# Patient Record
Sex: FEMALE | Race: WHITE | Employment: FULL TIME | URBAN - METROPOLITAN AREA
[De-identification: names, ages, dates, MRNs, and addresses within clinical notes are randomized per-mention and may not be internally consistent; named-entity substitution may affect disease eponyms.]

---

## 2018-05-17 PROBLEM — N92.0 MENORRHAGIA WITH REGULAR CYCLE: Status: ACTIVE | Noted: 2018-05-17

## 2018-07-03 ENCOUNTER — HOSPITAL ENCOUNTER (OUTPATIENT)
Dept: GENERAL RADIOLOGY | Age: 53
Discharge: HOME | End: 2018-07-05
Payer: COMMERCIAL

## 2018-07-03 DIAGNOSIS — Z12.31 SCREENING MAMMOGRAM, ENCOUNTER FOR: ICD-10-CM

## 2018-07-03 PROCEDURE — 77063 BREAST TOMOSYNTHESIS BI: CPT

## 2018-07-06 ENCOUNTER — OFFICE VISIT (OUTPATIENT)
Dept: PRIMARY CARE CLINIC | Age: 53
End: 2018-07-06
Payer: COMMERCIAL

## 2018-07-06 VITALS
BODY MASS INDEX: 25.95 KG/M2 | DIASTOLIC BLOOD PRESSURE: 64 MMHG | SYSTOLIC BLOOD PRESSURE: 112 MMHG | RESPIRATION RATE: 14 BRPM | OXYGEN SATURATION: 99 % | TEMPERATURE: 98.1 F | HEART RATE: 88 BPM | WEIGHT: 141 LBS | HEIGHT: 62 IN

## 2018-07-06 DIAGNOSIS — E78.00 PURE HYPERCHOLESTEROLEMIA: Primary | ICD-10-CM

## 2018-07-06 PROCEDURE — 99213 OFFICE O/P EST LOW 20 MIN: CPT | Performed by: PHYSICIAN ASSISTANT

## 2018-07-06 NOTE — PROGRESS NOTES
breath, chest heaviness, pleuritic pain,  wheezing. Cardiovascular: Negative for diaphoresis, chest pain, palpitations, or edema   Gastrointestinal: Negative for nausea, vomiting, abdominal pain, diarrhea, constipation, blood in stool, melena, changes in bowel habits, abdominal distention, anal bleeding and rectal pain. Endocrine: Negative for polydipsia, polyphagia and polyuria. No heat or cold intolerance. Genitourinary: Negative for dysuria, urgency, frequency, hematuria, difficulty urinating, nocturia. Musculoskeletal: as per HPI. Negative at this time for myalgias, back pain, joint swelling and arthralgias. No gait disturbance. Skin: Negative for rash, lesions, hair or nail changes. Allergic/Immunologic: Negative for environmental allergies and food allergies. Neurological: Negative for dizziness, weakness, tremors, syncope, speech difficulty, light-headedness, bowel or bladder control changes, numbness and headaches. Hematological: Negative for adenopathy. Does not bruise/bleed easily. Psychiatric/Behavioral: Negative for suicidal ideas, behavioral problems, sleep disturbance and decreased concentration. The patient is not nervous/anxious. Unless otherwise stated in this report or unable to obtain because of the patient's clinical or mental status as evidenced by the medical record, this patients's positive and negative responses for Review of Systems, constitutional, psych, eyes, ENT, cardiovascular, respiratory, gastrointestinal, neurological, genitourinary, musculoskeletal, integument systems and systems related to the presenting problem are either stated in the preceding or were not pertinent or were negative for the symptoms and/or complaints related to the medical problem.         Physical Exam:   Vitals:    07/06/18 1605   BP: 112/64   Pulse: 88   Resp: 14   Temp: 98.1 °F (36.7 °C)   SpO2: 99%   Weight: 141 lb (64 kg)   Height: 5' 2\" (1.575 m)     Constitutional:  A and O x 3, in

## 2018-07-17 ENCOUNTER — HOSPITAL ENCOUNTER (OUTPATIENT)
Age: 53
Discharge: HOME | End: 2018-07-19
Payer: COMMERCIAL

## 2018-07-17 DIAGNOSIS — E78.00 PURE HYPERCHOLESTEROLEMIA: ICD-10-CM

## 2018-07-17 LAB
ALBUMIN SERPL-MCNC: 4.2 G/DL (ref 3.5–5.2)
ALP BLD-CCNC: 72 U/L (ref 35–104)
ALT SERPL-CCNC: 22 U/L (ref 0–32)
ANION GAP SERPL CALCULATED.3IONS-SCNC: 12 MMOL/L (ref 7–16)
AST SERPL-CCNC: 25 U/L (ref 0–31)
BASOPHILS ABSOLUTE: 0.02 E9/L (ref 0–0.2)
BASOPHILS RELATIVE PERCENT: 0.3 % (ref 0–2)
BILIRUB SERPL-MCNC: 0.5 MG/DL (ref 0–1.2)
BUN BLDV-MCNC: 12 MG/DL (ref 6–20)
CALCIUM SERPL-MCNC: 9.6 MG/DL (ref 8.6–10.2)
CHLORIDE BLD-SCNC: 100 MMOL/L (ref 98–107)
CHOLESTEROL, TOTAL: 244 MG/DL (ref 0–199)
CO2: 26 MMOL/L (ref 22–29)
CREAT SERPL-MCNC: 0.7 MG/DL (ref 0.5–1)
EOSINOPHILS ABSOLUTE: 0.05 E9/L (ref 0.05–0.5)
EOSINOPHILS RELATIVE PERCENT: 0.7 % (ref 0–6)
GFR AFRICAN AMERICAN: >60
GFR NON-AFRICAN AMERICAN: >60 ML/MIN/1.73
GLUCOSE BLD-MCNC: 87 MG/DL (ref 74–109)
HCT VFR BLD CALC: 38.1 % (ref 34–48)
HDLC SERPL-MCNC: 43 MG/DL
HEMOGLOBIN: 12.5 G/DL (ref 11.5–15.5)
IMMATURE GRANULOCYTES #: 0.02 E9/L
IMMATURE GRANULOCYTES %: 0.3 % (ref 0–5)
LDL CHOLESTEROL CALCULATED: 176 MG/DL (ref 0–99)
LYMPHOCYTES ABSOLUTE: 1.16 E9/L (ref 1.5–4)
LYMPHOCYTES RELATIVE PERCENT: 17.3 % (ref 20–42)
MCH RBC QN AUTO: 30.2 PG (ref 26–35)
MCHC RBC AUTO-ENTMCNC: 32.8 % (ref 32–34.5)
MCV RBC AUTO: 92 FL (ref 80–99.9)
MONOCYTES ABSOLUTE: 0.63 E9/L (ref 0.1–0.95)
MONOCYTES RELATIVE PERCENT: 9.4 % (ref 2–12)
NEUTROPHILS ABSOLUTE: 4.81 E9/L (ref 1.8–7.3)
NEUTROPHILS RELATIVE PERCENT: 72 % (ref 43–80)
PDW BLD-RTO: 12.5 FL (ref 11.5–15)
PLATELET # BLD: 434 E9/L (ref 130–450)
PMV BLD AUTO: 10.2 FL (ref 7–12)
POTASSIUM SERPL-SCNC: 3.9 MMOL/L (ref 3.5–5)
RBC # BLD: 4.14 E12/L (ref 3.5–5.5)
SODIUM BLD-SCNC: 138 MMOL/L (ref 132–146)
TOTAL PROTEIN: 7.2 G/DL (ref 6.4–8.3)
TRIGL SERPL-MCNC: 126 MG/DL (ref 0–149)
VLDLC SERPL CALC-MCNC: 25 MG/DL
WBC # BLD: 6.7 E9/L (ref 4.5–11.5)

## 2018-07-17 PROCEDURE — 80053 COMPREHEN METABOLIC PANEL: CPT

## 2018-07-17 PROCEDURE — 85025 COMPLETE CBC W/AUTO DIFF WBC: CPT

## 2018-07-17 PROCEDURE — 80061 LIPID PANEL: CPT

## 2019-01-15 ENCOUNTER — OFFICE VISIT (OUTPATIENT)
Dept: PRIMARY CARE CLINIC | Age: 54
End: 2019-01-15
Payer: COMMERCIAL

## 2019-01-15 VITALS
WEIGHT: 136 LBS | SYSTOLIC BLOOD PRESSURE: 124 MMHG | TEMPERATURE: 98 F | DIASTOLIC BLOOD PRESSURE: 78 MMHG | OXYGEN SATURATION: 99 % | HEART RATE: 74 BPM | BODY MASS INDEX: 24.87 KG/M2

## 2019-01-15 DIAGNOSIS — E78.00 PURE HYPERCHOLESTEROLEMIA: Primary | ICD-10-CM

## 2019-01-15 DIAGNOSIS — F41.1 GAD (GENERALIZED ANXIETY DISORDER): ICD-10-CM

## 2019-01-15 PROCEDURE — 99213 OFFICE O/P EST LOW 20 MIN: CPT | Performed by: PHYSICIAN ASSISTANT

## 2019-01-15 RX ORDER — ALPRAZOLAM 0.25 MG/1
0.25 TABLET ORAL DAILY PRN
Qty: 30 TABLET | Refills: 0 | Status: SHIPPED | OUTPATIENT
Start: 2019-01-15 | End: 2019-08-30 | Stop reason: SDUPTHER

## 2019-01-15 RX ORDER — BUPROPION HYDROCHLORIDE 150 MG/1
TABLET, EXTENDED RELEASE ORAL
COMMUNITY
End: 2019-01-15 | Stop reason: ALTCHOICE

## 2019-01-15 RX ORDER — VITAMIN B COMPLEX
1 CAPSULE ORAL DAILY
COMMUNITY

## 2019-01-15 RX ORDER — AMPICILLIN TRIHYDRATE 250 MG
CAPSULE ORAL
COMMUNITY
End: 2019-01-15 | Stop reason: ALTCHOICE

## 2019-01-15 RX ORDER — ATORVASTATIN CALCIUM 20 MG/1
TABLET, FILM COATED ORAL
COMMUNITY
End: 2019-01-15 | Stop reason: ALTCHOICE

## 2019-01-15 ASSESSMENT — PATIENT HEALTH QUESTIONNAIRE - PHQ9
SUM OF ALL RESPONSES TO PHQ9 QUESTIONS 1 & 2: 0
SUM OF ALL RESPONSES TO PHQ QUESTIONS 1-9: 0
SUM OF ALL RESPONSES TO PHQ QUESTIONS 1-9: 0
2. FEELING DOWN, DEPRESSED OR HOPELESS: 0
1. LITTLE INTEREST OR PLEASURE IN DOING THINGS: 0

## 2019-01-17 ENCOUNTER — HOSPITAL ENCOUNTER (OUTPATIENT)
Age: 54
Discharge: HOME | End: 2019-01-19
Payer: COMMERCIAL

## 2019-01-17 DIAGNOSIS — E78.00 PURE HYPERCHOLESTEROLEMIA: ICD-10-CM

## 2019-01-17 DIAGNOSIS — F41.1 GAD (GENERALIZED ANXIETY DISORDER): ICD-10-CM

## 2019-01-17 LAB
ALBUMIN SERPL-MCNC: 4.5 G/DL (ref 3.5–5.2)
ALP BLD-CCNC: 47 U/L (ref 35–104)
ALT SERPL-CCNC: 14 U/L (ref 0–32)
ANION GAP SERPL CALCULATED.3IONS-SCNC: 22 MMOL/L (ref 7–16)
AST SERPL-CCNC: 22 U/L (ref 0–31)
BASOPHILS ABSOLUTE: 0.02 E9/L (ref 0–0.2)
BASOPHILS RELATIVE PERCENT: 0.4 % (ref 0–2)
BILIRUB SERPL-MCNC: 0.4 MG/DL (ref 0–1.2)
BUN BLDV-MCNC: 12 MG/DL (ref 6–20)
CALCIUM SERPL-MCNC: 9.1 MG/DL (ref 8.6–10.2)
CHLORIDE BLD-SCNC: 99 MMOL/L (ref 98–107)
CHOLESTEROL, TOTAL: 239 MG/DL (ref 0–199)
CO2: 21 MMOL/L (ref 22–29)
CREAT SERPL-MCNC: 0.8 MG/DL (ref 0.5–1)
EOSINOPHILS ABSOLUTE: 0.12 E9/L (ref 0.05–0.5)
EOSINOPHILS RELATIVE PERCENT: 2.2 % (ref 0–6)
GFR AFRICAN AMERICAN: >60
GFR NON-AFRICAN AMERICAN: >60 ML/MIN/1.73
GLUCOSE BLD-MCNC: 80 MG/DL (ref 74–99)
HCT VFR BLD CALC: 41 % (ref 34–48)
HDLC SERPL-MCNC: 44 MG/DL
HEMOGLOBIN: 13 G/DL (ref 11.5–15.5)
IMMATURE GRANULOCYTES #: 0.01 E9/L
IMMATURE GRANULOCYTES %: 0.2 % (ref 0–5)
LDL CHOLESTEROL CALCULATED: 166 MG/DL (ref 0–99)
LYMPHOCYTES ABSOLUTE: 1.21 E9/L (ref 1.5–4)
LYMPHOCYTES RELATIVE PERCENT: 22.6 % (ref 20–42)
MCH RBC QN AUTO: 30.6 PG (ref 26–35)
MCHC RBC AUTO-ENTMCNC: 31.7 % (ref 32–34.5)
MCV RBC AUTO: 96.5 FL (ref 80–99.9)
MONOCYTES ABSOLUTE: 0.58 E9/L (ref 0.1–0.95)
MONOCYTES RELATIVE PERCENT: 10.8 % (ref 2–12)
NEUTROPHILS ABSOLUTE: 3.42 E9/L (ref 1.8–7.3)
NEUTROPHILS RELATIVE PERCENT: 63.8 % (ref 43–80)
PDW BLD-RTO: 12.8 FL (ref 11.5–15)
PLATELET # BLD: 311 E9/L (ref 130–450)
PMV BLD AUTO: 10.5 FL (ref 7–12)
POTASSIUM SERPL-SCNC: 3.9 MMOL/L (ref 3.5–5)
RBC # BLD: 4.25 E12/L (ref 3.5–5.5)
SODIUM BLD-SCNC: 142 MMOL/L (ref 132–146)
TOTAL PROTEIN: 7.2 G/DL (ref 6.4–8.3)
TRIGL SERPL-MCNC: 147 MG/DL (ref 0–149)
VLDLC SERPL CALC-MCNC: 29 MG/DL
WBC # BLD: 5.4 E9/L (ref 4.5–11.5)

## 2019-01-17 PROCEDURE — 80053 COMPREHEN METABOLIC PANEL: CPT

## 2019-01-17 PROCEDURE — 80061 LIPID PANEL: CPT

## 2019-01-17 PROCEDURE — 85025 COMPLETE CBC W/AUTO DIFF WBC: CPT

## 2019-03-27 ENCOUNTER — HOSPITAL ENCOUNTER (OUTPATIENT)
Dept: GENERAL RADIOLOGY | Age: 54
Discharge: HOME | End: 2019-03-29
Payer: COMMERCIAL

## 2019-03-27 DIAGNOSIS — N63.20 BREAST MASS, LEFT: ICD-10-CM

## 2019-03-27 PROCEDURE — G0279 TOMOSYNTHESIS, MAMMO: HCPCS

## 2019-03-27 PROCEDURE — 76642 ULTRASOUND BREAST LIMITED: CPT

## 2019-08-30 ENCOUNTER — OFFICE VISIT (OUTPATIENT)
Dept: PRIMARY CARE CLINIC | Age: 54
End: 2019-08-30
Payer: COMMERCIAL

## 2019-08-30 ENCOUNTER — HOSPITAL ENCOUNTER (OUTPATIENT)
Age: 54
Discharge: HOME | End: 2019-09-01
Payer: COMMERCIAL

## 2019-08-30 VITALS
BODY MASS INDEX: 24.75 KG/M2 | HEIGHT: 62 IN | DIASTOLIC BLOOD PRESSURE: 60 MMHG | SYSTOLIC BLOOD PRESSURE: 98 MMHG | TEMPERATURE: 98.1 F | OXYGEN SATURATION: 96 % | HEART RATE: 83 BPM | WEIGHT: 134.5 LBS

## 2019-08-30 DIAGNOSIS — E78.00 PURE HYPERCHOLESTEROLEMIA: Primary | ICD-10-CM

## 2019-08-30 DIAGNOSIS — E78.00 PURE HYPERCHOLESTEROLEMIA: ICD-10-CM

## 2019-08-30 DIAGNOSIS — F41.1 GAD (GENERALIZED ANXIETY DISORDER): ICD-10-CM

## 2019-08-30 LAB
ALBUMIN SERPL-MCNC: 4.6 G/DL (ref 3.5–5.2)
ALP BLD-CCNC: 46 U/L (ref 35–104)
ALT SERPL-CCNC: 20 U/L (ref 0–32)
ANION GAP SERPL CALCULATED.3IONS-SCNC: 11 MMOL/L (ref 7–16)
AST SERPL-CCNC: 28 U/L (ref 0–31)
BILIRUB SERPL-MCNC: 0.6 MG/DL (ref 0–1.2)
BUN BLDV-MCNC: 12 MG/DL (ref 6–20)
CALCIUM SERPL-MCNC: 9.3 MG/DL (ref 8.6–10.2)
CHLORIDE BLD-SCNC: 103 MMOL/L (ref 98–107)
CHOLESTEROL, TOTAL: 206 MG/DL (ref 0–199)
CO2: 26 MMOL/L (ref 22–29)
CREAT SERPL-MCNC: 0.8 MG/DL (ref 0.5–1)
GFR AFRICAN AMERICAN: >60
GFR NON-AFRICAN AMERICAN: >60 ML/MIN/1.73
GLUCOSE BLD-MCNC: 92 MG/DL (ref 74–99)
HDLC SERPL-MCNC: 51 MG/DL
LDL CHOLESTEROL CALCULATED: 137 MG/DL (ref 0–99)
POTASSIUM SERPL-SCNC: 4.9 MMOL/L (ref 3.5–5)
SODIUM BLD-SCNC: 140 MMOL/L (ref 132–146)
TOTAL PROTEIN: 7.2 G/DL (ref 6.4–8.3)
TRIGL SERPL-MCNC: 91 MG/DL (ref 0–149)
VLDLC SERPL CALC-MCNC: 18 MG/DL

## 2019-08-30 PROCEDURE — 80053 COMPREHEN METABOLIC PANEL: CPT

## 2019-08-30 PROCEDURE — 80061 LIPID PANEL: CPT

## 2019-08-30 PROCEDURE — 99213 OFFICE O/P EST LOW 20 MIN: CPT | Performed by: PHYSICIAN ASSISTANT

## 2019-08-30 RX ORDER — AMPICILLIN TRIHYDRATE 250 MG
CAPSULE ORAL
COMMUNITY

## 2019-08-30 RX ORDER — ALPRAZOLAM 0.25 MG/1
0.25 TABLET ORAL DAILY PRN
Qty: 30 TABLET | Refills: 0
Start: 2019-08-30 | End: 2019-09-29

## 2019-10-25 ENCOUNTER — OFFICE VISIT (OUTPATIENT)
Dept: FAMILY MEDICINE CLINIC | Age: 54
End: 2019-10-25
Payer: COMMERCIAL

## 2019-10-25 VITALS
SYSTOLIC BLOOD PRESSURE: 138 MMHG | DIASTOLIC BLOOD PRESSURE: 76 MMHG | OXYGEN SATURATION: 97 % | BODY MASS INDEX: 25.28 KG/M2 | TEMPERATURE: 98.1 F | HEART RATE: 96 BPM | WEIGHT: 138.2 LBS

## 2019-10-25 DIAGNOSIS — J06.9 UPPER RESPIRATORY INFECTION WITH COUGH AND CONGESTION: Primary | ICD-10-CM

## 2019-10-25 PROCEDURE — 99213 OFFICE O/P EST LOW 20 MIN: CPT | Performed by: NURSE PRACTITIONER

## 2019-10-25 RX ORDER — BROMPHENIRAMINE MALEATE, PSEUDOEPHEDRINE HYDROCHLORIDE, AND DEXTROMETHORPHAN HYDROBROMIDE 2; 30; 10 MG/5ML; MG/5ML; MG/5ML
10 SYRUP ORAL 4 TIMES DAILY PRN
Qty: 473 ML | Refills: 0 | Status: SHIPPED | OUTPATIENT
Start: 2019-10-25 | End: 2019-12-13 | Stop reason: ALTCHOICE

## 2019-12-13 ENCOUNTER — OFFICE VISIT (OUTPATIENT)
Dept: PRIMARY CARE CLINIC | Age: 54
End: 2019-12-13
Payer: COMMERCIAL

## 2019-12-13 VITALS
HEIGHT: 62 IN | OXYGEN SATURATION: 98 % | WEIGHT: 133 LBS | TEMPERATURE: 97.7 F | BODY MASS INDEX: 24.48 KG/M2 | SYSTOLIC BLOOD PRESSURE: 118 MMHG | DIASTOLIC BLOOD PRESSURE: 60 MMHG | HEART RATE: 99 BPM

## 2019-12-13 DIAGNOSIS — F41.1 GAD (GENERALIZED ANXIETY DISORDER): Primary | ICD-10-CM

## 2019-12-13 PROCEDURE — 99213 OFFICE O/P EST LOW 20 MIN: CPT | Performed by: PHYSICIAN ASSISTANT

## 2020-01-09 ENCOUNTER — HOSPITAL ENCOUNTER (OUTPATIENT)
Age: 55
Discharge: HOME | End: 2020-01-11
Payer: COMMERCIAL

## 2020-01-09 LAB
ALBUMIN SERPL-MCNC: 4.7 G/DL (ref 3.5–5.2)
ALP BLD-CCNC: 48 U/L (ref 35–104)
ALT SERPL-CCNC: 33 U/L (ref 0–32)
ANION GAP SERPL CALCULATED.3IONS-SCNC: 15 MMOL/L (ref 7–16)
AST SERPL-CCNC: 31 U/L (ref 0–31)
BILIRUB SERPL-MCNC: 0.5 MG/DL (ref 0–1.2)
BUN BLDV-MCNC: 15 MG/DL (ref 6–20)
CALCIUM SERPL-MCNC: 9.7 MG/DL (ref 8.6–10.2)
CHLORIDE BLD-SCNC: 101 MMOL/L (ref 98–107)
CHOLESTEROL, TOTAL: 194 MG/DL (ref 0–199)
CO2: 27 MMOL/L (ref 22–29)
CREAT SERPL-MCNC: 0.8 MG/DL (ref 0.5–1)
GFR AFRICAN AMERICAN: >60
GFR NON-AFRICAN AMERICAN: >60 ML/MIN/1.73
GLUCOSE BLD-MCNC: 67 MG/DL (ref 74–99)
HDLC SERPL-MCNC: 53 MG/DL
LDL CHOLESTEROL CALCULATED: 119 MG/DL (ref 0–99)
POTASSIUM SERPL-SCNC: 3.9 MMOL/L (ref 3.5–5)
SODIUM BLD-SCNC: 143 MMOL/L (ref 132–146)
TOTAL PROTEIN: 7.8 G/DL (ref 6.4–8.3)
TRIGL SERPL-MCNC: 111 MG/DL (ref 0–149)
VLDLC SERPL CALC-MCNC: 22 MG/DL

## 2020-01-09 PROCEDURE — 80053 COMPREHEN METABOLIC PANEL: CPT

## 2020-01-09 PROCEDURE — 80061 LIPID PANEL: CPT

## 2020-01-13 RX ORDER — PITAVASTATIN CALCIUM 2.09 MG/1
TABLET, FILM COATED ORAL
Qty: 90 TABLET | Refills: 1 | OUTPATIENT
Start: 2020-01-13

## 2020-07-01 RX ORDER — PITAVASTATIN CALCIUM 4.18 MG/1
TABLET, FILM COATED ORAL
Qty: 90 TABLET | Refills: 1 | Status: SHIPPED
Start: 2020-07-01 | End: 2020-07-21 | Stop reason: SDUPTHER

## 2020-07-22 RX ORDER — PITAVASTATIN CALCIUM 4.18 MG/1
4 TABLET, FILM COATED ORAL NIGHTLY
Qty: 90 TABLET | Refills: 1 | Status: SHIPPED
Start: 2020-07-22 | End: 2020-12-11

## 2020-08-31 ENCOUNTER — HOSPITAL ENCOUNTER (OUTPATIENT)
Age: 55
Discharge: HOME | End: 2020-09-02
Payer: COMMERCIAL

## 2020-08-31 ENCOUNTER — NURSE ONLY (OUTPATIENT)
Dept: PRIMARY CARE CLINIC | Age: 55
End: 2020-08-31

## 2020-08-31 PROCEDURE — U0003 INFECTIOUS AGENT DETECTION BY NUCLEIC ACID (DNA OR RNA); SEVERE ACUTE RESPIRATORY SYNDROME CORONAVIRUS 2 (SARS-COV-2) (CORONAVIRUS DISEASE [COVID-19]), AMPLIFIED PROBE TECHNIQUE, MAKING USE OF HIGH THROUGHPUT TECHNOLOGIES AS DESCRIBED BY CMS-2020-01-R: HCPCS

## 2020-09-01 LAB
SARS-COV-2: DETECTED
SOURCE: ABNORMAL

## 2020-09-08 ENCOUNTER — VIRTUAL VISIT (OUTPATIENT)
Dept: PRIMARY CARE CLINIC | Age: 55
End: 2020-09-08
Payer: COMMERCIAL

## 2020-09-08 PROCEDURE — 99213 OFFICE O/P EST LOW 20 MIN: CPT | Performed by: INTERNAL MEDICINE

## 2020-09-08 RX ORDER — AZITHROMYCIN 250 MG/1
250 TABLET, FILM COATED ORAL SEE ADMIN INSTRUCTIONS
Qty: 6 TABLET | Refills: 0 | Status: SHIPPED | OUTPATIENT
Start: 2020-09-08 | End: 2020-09-13

## 2020-09-08 RX ORDER — FLUTICASONE PROPIONATE 50 MCG
2 SPRAY, SUSPENSION (ML) NASAL DAILY
Qty: 1 BOTTLE | Refills: 0 | Status: SHIPPED
Start: 2020-09-08 | End: 2022-03-11

## 2020-09-08 ASSESSMENT — PATIENT HEALTH QUESTIONNAIRE - PHQ9
SUM OF ALL RESPONSES TO PHQ QUESTIONS 1-9: 0
2. FEELING DOWN, DEPRESSED OR HOPELESS: 0
SUM OF ALL RESPONSES TO PHQ QUESTIONS 1-9: 0
1. LITTLE INTEREST OR PLEASURE IN DOING THINGS: 0
SUM OF ALL RESPONSES TO PHQ9 QUESTIONS 1 & 2: 0

## 2020-09-08 NOTE — PROGRESS NOTES
9/8/20    Ganga Herzog, a female of 54 y.o. came to the office     Ganga Herzog presents today for evaluation of a cloudy sensation in her head dizziness and headaches. Her symptoms started on August 29 with fatigue myalgias and a cough. She was tested for coronavirus on September 1 and was positive. Since then she has been taking ibuprofen multiple times throughout a day which helps with her headaches but her cloudiness dizziness ear pain and sinus congestion have persisted. She denies any fevers chills or GI issues. Her chronic medical history is significant for anxiety and hypercholesterolemia. She denies any seasonal allergies or asthma      Patient Active Problem List   Diagnosis    Pure hypercholesterolemia    SUZANNE (generalized anxiety disorder)    Menorrhagia with regular cycle      No Known Allergies  Current Outpatient Medications on File Prior to Visit   Medication Sig Dispense Refill    sertraline (ZOLOFT) 50 MG tablet Take 1 tablet by mouth daily 30 tablet 5    pitavastatin (LIVALO) 4 MG TABS tablet Take 1 tablet by mouth nightly 90 tablet 1    Coenzyme Q10 (COQ10) 200 MG CAPS Take by mouth      b complex vitamins capsule Take 1 capsule by mouth daily      vitamin D (CHOLECALCIFEROL) 1000 UNIT TABS tablet Take 1,000 Units by mouth daily       No current facility-administered medications on file prior to visit. Review of Systems  Constitutional:Negative for activity change, appetite change, chills, fatigue and fever. Respiratory: Negative for choking, chest tightness, shortness of breath and wheezing. Cardiovascular: Negative for chest pain, palpitations and leg swelling. Gastrointestinal: Negative for abdominal distention, constipation, diarrhea, nausea and vomiting. Musculoskeletal: Negative for arthralgias, back pain, gait problem and joint swelling. Neurological: Negative for dizziness, weakness,numbness and headaches.      There were no vitals taken for this visit.     Physical Exam   Constitutional:  Oriented to person, place, and time. Appears well-developed and well-nourished. No acute distress. HENT: No sinus tenderness or lymphadenopathy  Head: Normocephalic and atraumatic. Eyes: Eyes exhibits no discharge. No scleral icterus present. Neck: No tracheal deviation present. No thyromegaly present. Cardiovascular: Normal rate, regular rhythm, normal heart sounds and intact distal pulses. Exam reveals no gallop nor friction rub. No murmur heard. Pulmonary: Effort normal and breath sounds normal. No respiratory distress. No wheezes or rales. Musculoskeletal:  No tenderness to palpation  Neurological:Alert and oriented to person, place, and time. Skin: No diaphoresis. Psychiatric: Normal mood and affect. Behavior is Normal.     ASSESSMENT AND PLAN:    Myra Ask was seen today for sinusitis. Diagnoses and all orders for this visit:    Coronavirus infection        Lauren Wild presents today for evaluation of persistent URI symptoms. She was previously diagnosed with coronavirus. She is failed symptomatic management. I will treat her with azithromycin for potential superimposed bacterial infection. I will also treat her with Flonase to combat her symptoms. She should continue ibuprofen and Tylenol as needed for her headaches      Please note that the above documentation was prepared using voice recognition software. Every attempt was made to ensure accuracy but there may be spelling, grammatical, and contextual errors. Electronically signed by Raylene Spatz, DO on 9/8/2020 at 1:57 PM        No follow-ups on file.     Raylene Spatz, DO

## 2021-01-19 ENCOUNTER — VIRTUAL VISIT (OUTPATIENT)
Dept: PRIMARY CARE CLINIC | Age: 56
End: 2021-01-19
Payer: COMMERCIAL

## 2021-01-19 DIAGNOSIS — M54.9 OTHER ACUTE BACK PAIN: ICD-10-CM

## 2021-01-19 DIAGNOSIS — E78.00 PURE HYPERCHOLESTEROLEMIA: Primary | ICD-10-CM

## 2021-01-19 PROCEDURE — 99213 OFFICE O/P EST LOW 20 MIN: CPT | Performed by: PHYSICIAN ASSISTANT

## 2021-01-19 RX ORDER — METHOCARBAMOL 750 MG/1
750 TABLET, FILM COATED ORAL 3 TIMES DAILY PRN
Qty: 30 TABLET | Refills: 0 | Status: SHIPPED | OUTPATIENT
Start: 2021-01-19 | End: 2021-01-29

## 2021-01-19 ASSESSMENT — PATIENT HEALTH QUESTIONNAIRE - PHQ9
SUM OF ALL RESPONSES TO PHQ9 QUESTIONS 1 & 2: 0
1. LITTLE INTEREST OR PLEASURE IN DOING THINGS: 0
SUM OF ALL RESPONSES TO PHQ QUESTIONS 1-9: 0
SUM OF ALL RESPONSES TO PHQ QUESTIONS 1-9: 0
2. FEELING DOWN, DEPRESSED OR HOPELESS: 0

## 2021-01-19 NOTE — PROGRESS NOTES
TELEHEALTH VIDEO VISIT    HPI:    Preston Paul (:  1965) has requested an audio/video evaluation for the following concern(s):    Chief Complaint   Patient presents with    Back Pain     Pt presents with complaints of L sided back pain, notes in shoulder blade area, tells me has been going now for over 1 month. Denies any trauma or injury. She states woke up with it one day, and it hasn't eased up. Denies it worsening, but has been trying 400mg ibuprofen without any relief. Admits standing up seems to erelieve it, but anytime she takes a deep breath, or is sitting and breaths it is painful. Feel pressure when she pushes on it, but not necessarily painful to push. No pain with twisting or lifting. No nocturnal pain. PT is on livalo, hasn't had labs in 1 year. Review of Systems unless otherwise mentioned above, pertinent ROS is considered negative. Prior to Visit Medications    Medication Sig Taking?  Authorizing Provider   methocarbamol (ROBAXIN-750) 750 MG tablet Take 1 tablet by mouth 3 times daily as needed (back pain) Yes Lucille Albert PA-C   LIVALO 4 MG TABS tablet TAKE 1 TABLET BY MOUTH AT  NIGHT Yes Jakob Rosario PA-C   sertraline (ZOLOFT) 50 MG tablet Take 1 tablet by mouth daily Yes Jakob Rosario PA-C   Coenzyme Q10 (COQ10) 200 MG CAPS Take by mouth Yes Historical Provider, MD   b complex vitamins capsule Take 1 capsule by mouth daily Yes Historical Provider, MD   vitamin D (CHOLECALCIFEROL) 1000 UNIT TABS tablet Take 1,000 Units by mouth daily Yes Historical Provider, MD   fluticasone (FLONASE) 50 MCG/ACT nasal spray 2 sprays by Each Nostril route daily  Patient not taking: Reported on 2021  Doreen Alonso DO       Social History     Tobacco Use    Smoking status: Never Smoker    Smokeless tobacco: Never Used   Substance Use Topics    Alcohol use: Yes     Comment: socially    Drug use: No        No Known Allergies,   Past Medical History:   Diagnosis Date 2. Other acute back pain, likely musculosketal but will check cxr as well. She is worried. I will obtain labs. reial muscle relaxant, and to increase ibuprofen up to 800mg at a tie every 6-8 hours. Ice heat to area. - CBC; Future  - methocarbamol (ROBAXIN-750) 750 MG tablet; Take 1 tablet by mouth 3 times daily as needed (back pain)  Dispense: 30 tablet; Refill: 0  - XR CHEST (2 VW); Future  - XR THORACIC SPINE (3 VIEWS); Future      eleMedicine video visit    This visit was performed as a virtual video visit using a synchronous, two-way, audio-video telehealth technology platform. Patient identification was verified at the start of the visit, including the patient's telephone number and physical location. I discussed with the patient the nature of our telehealth visits, that:     1. Due to the nature of an audio- video modality, the only components of a physical exam that could be done are the elements supported by direct observation. 2. I would evaluate the patient and recommend diagnostics and treatments based on my assessment. 3. If it was felt that the patient should be evaluated in clinic or an emergency room setting, then they would be directed there. 4. Our sessions are not being recorded and that personal health information is protected. 5. Our team would provide follow up care in person if/when the patient needs it. Patient does agree to proceed with telemedicine consultation. Patient's location: home address in [facility]  Physician  location home address in [facility] other people involved in call none. Time spent: Greater than Not billed by time    This visit was completed virtually using Doxy. me    --Diego Watkins PA-C on 1/19/2021 at 4:00 PM    An electronic signature was used to authenticate this note.

## 2021-01-20 ENCOUNTER — HOSPITAL ENCOUNTER (OUTPATIENT)
Dept: GENERAL RADIOLOGY | Age: 56
Discharge: HOME | End: 2021-01-22
Payer: COMMERCIAL

## 2021-01-20 ENCOUNTER — HOSPITAL ENCOUNTER (OUTPATIENT)
Age: 56
Discharge: HOME | End: 2021-01-22
Payer: COMMERCIAL

## 2021-01-20 DIAGNOSIS — M54.9 OTHER ACUTE BACK PAIN: ICD-10-CM

## 2021-01-20 PROCEDURE — 71046 X-RAY EXAM CHEST 2 VIEWS: CPT

## 2021-01-20 PROCEDURE — 72072 X-RAY EXAM THORAC SPINE 3VWS: CPT

## 2021-01-21 DIAGNOSIS — M54.9 OTHER ACUTE BACK PAIN: ICD-10-CM

## 2021-01-21 DIAGNOSIS — E78.00 PURE HYPERCHOLESTEROLEMIA: ICD-10-CM

## 2021-01-21 LAB
ALBUMIN SERPL-MCNC: 4.4 G/DL (ref 3.5–5.2)
ALP BLD-CCNC: 43 U/L (ref 35–104)
ALT SERPL-CCNC: 24 U/L (ref 0–32)
ANION GAP SERPL CALCULATED.3IONS-SCNC: 12 MMOL/L (ref 7–16)
AST SERPL-CCNC: 27 U/L (ref 0–31)
BILIRUB SERPL-MCNC: 0.5 MG/DL (ref 0–1.2)
BUN BLDV-MCNC: 11 MG/DL (ref 6–20)
CALCIUM SERPL-MCNC: 9.5 MG/DL (ref 8.6–10.2)
CHLORIDE BLD-SCNC: 103 MMOL/L (ref 98–107)
CHOLESTEROL, TOTAL: 174 MG/DL (ref 0–199)
CO2: 27 MMOL/L (ref 22–29)
CREAT SERPL-MCNC: 0.8 MG/DL (ref 0.5–1)
GFR AFRICAN AMERICAN: >60
GFR NON-AFRICAN AMERICAN: >60 ML/MIN/1.73
GLUCOSE BLD-MCNC: 86 MG/DL (ref 74–99)
HCT VFR BLD CALC: 39.8 % (ref 34–48)
HDLC SERPL-MCNC: 54 MG/DL
HEMOGLOBIN: 13 G/DL (ref 11.5–15.5)
LDL CHOLESTEROL CALCULATED: 97 MG/DL (ref 0–99)
MCH RBC QN AUTO: 30.8 PG (ref 26–35)
MCHC RBC AUTO-ENTMCNC: 32.7 % (ref 32–34.5)
MCV RBC AUTO: 94.3 FL (ref 80–99.9)
PDW BLD-RTO: 12.5 FL (ref 11.5–15)
PLATELET # BLD: 305 E9/L (ref 130–450)
PMV BLD AUTO: 10.1 FL (ref 7–12)
POTASSIUM SERPL-SCNC: 4.1 MMOL/L (ref 3.5–5)
RBC # BLD: 4.22 E12/L (ref 3.5–5.5)
SODIUM BLD-SCNC: 142 MMOL/L (ref 132–146)
TOTAL PROTEIN: 7 G/DL (ref 6.4–8.3)
TRIGL SERPL-MCNC: 113 MG/DL (ref 0–149)
VLDLC SERPL CALC-MCNC: 23 MG/DL
WBC # BLD: 5.4 E9/L (ref 4.5–11.5)

## 2021-08-30 DIAGNOSIS — F41.1 GAD (GENERALIZED ANXIETY DISORDER): ICD-10-CM

## 2021-09-23 DIAGNOSIS — F41.1 GAD (GENERALIZED ANXIETY DISORDER): ICD-10-CM

## 2022-03-10 DIAGNOSIS — F41.1 GAD (GENERALIZED ANXIETY DISORDER): ICD-10-CM

## 2022-03-11 ENCOUNTER — OFFICE VISIT (OUTPATIENT)
Dept: PRIMARY CARE CLINIC | Age: 57
End: 2022-03-11
Payer: COMMERCIAL

## 2022-03-11 VITALS
DIASTOLIC BLOOD PRESSURE: 64 MMHG | HEART RATE: 84 BPM | TEMPERATURE: 97.5 F | WEIGHT: 139 LBS | BODY MASS INDEX: 25.42 KG/M2 | SYSTOLIC BLOOD PRESSURE: 118 MMHG

## 2022-03-11 DIAGNOSIS — E55.9 VITAMIN D DEFICIENCY: ICD-10-CM

## 2022-03-11 DIAGNOSIS — N95.1 HOT FLASH, MENOPAUSAL: ICD-10-CM

## 2022-03-11 DIAGNOSIS — F41.1 GAD (GENERALIZED ANXIETY DISORDER): Primary | ICD-10-CM

## 2022-03-11 DIAGNOSIS — Z11.52 ENCOUNTER FOR SCREENING FOR COVID-19: ICD-10-CM

## 2022-03-11 DIAGNOSIS — Z12.11 ENCOUNTER FOR SCREENING COLONOSCOPY: ICD-10-CM

## 2022-03-11 DIAGNOSIS — Z80.0 FAMILY HISTORY OF COLON CANCER: ICD-10-CM

## 2022-03-11 DIAGNOSIS — E78.00 PURE HYPERCHOLESTEROLEMIA: ICD-10-CM

## 2022-03-11 PROCEDURE — 99214 OFFICE O/P EST MOD 30 MIN: CPT | Performed by: PHYSICIAN ASSISTANT

## 2022-03-11 RX ORDER — VENLAFAXINE HYDROCHLORIDE 37.5 MG/1
37.5 CAPSULE, EXTENDED RELEASE ORAL DAILY
Qty: 30 CAPSULE | Refills: 2 | Status: SHIPPED
Start: 2022-03-11 | End: 2022-04-29 | Stop reason: SDUPTHER

## 2022-03-11 ASSESSMENT — PATIENT HEALTH QUESTIONNAIRE - PHQ9
SUM OF ALL RESPONSES TO PHQ QUESTIONS 1-9: 0
2. FEELING DOWN, DEPRESSED OR HOPELESS: 0

## 2022-03-11 NOTE — PROGRESS NOTES
Deysi Caty  1965  3/11/22      HPI:  Patient presents for FU of her hyperlipidemia. She admits that Livalo is expensive so to cut cost she has been taking it 4 days a week. States if lipids come back high she will go to 7 days a week. She does well on it and really doesn't want to change, due to side effects from other statins. She has been well controlled with her anxiety on the sertraline, but admits that she would like to switch to Effexor, as she is sufferin gfrom postmenopausal hot flashes. She also has been taking Vit D, needs this level checked. Gets all GYN care routinely. Is overdue for colonoscopy. Past Medical History:   Diagnosis Date    Pure hypercholesterolemia 7/27/2016        Past Surgical History:   Procedure Laterality Date    BREAST SURGERY Left     benign lesion    CHOLECYSTECTOMY         Current Outpatient Medications   Medication Sig Dispense Refill    venlafaxine (EFFEXOR XR) 37.5 MG extended release capsule Take 1 capsule by mouth daily 30 capsule 2    LIVALO 4 MG TABS tablet TAKE 1 TABLET BY MOUTH AT  NIGHT 90 tablet 0    Coenzyme Q10 (COQ10) 200 MG CAPS Take by mouth      b complex vitamins capsule Take 1 capsule by mouth daily      vitamin D (CHOLECALCIFEROL) 1000 UNIT TABS tablet Take 1,000 Units by mouth daily       No current facility-administered medications for this visit. No Known Allergies    No family history on file.     Social History     Socioeconomic History    Marital status:      Spouse name: Not on file    Number of children: Not on file    Years of education: Not on file    Highest education level: Not on file   Occupational History    Not on file   Tobacco Use    Smoking status: Never Smoker    Smokeless tobacco: Never Used   Substance and Sexual Activity    Alcohol use: Yes     Comment: socially    Drug use: No    Sexual activity: Yes     Partners: Male   Other Topics Concern    Not on file   Social History Narrative    Not on file     Social Determinants of Health     Financial Resource Strain:     Difficulty of Paying Living Expenses: Not on file   Food Insecurity:     Worried About Running Out of Food in the Last Year: Not on file    [place] of Food in the Last Year: Not on file   Transportation Needs:     Lack of Transportation (Medical): Not on file    Lack of Transportation (Non-Medical): Not on file   Physical Activity:     Days of Exercise per Week: Not on file    Minutes of Exercise per Session: Not on file   Stress:     Feeling of Stress : Not on file   Social Connections:     Frequency of Communication with Friends and Family: Not on file    Frequency of Social Gatherings with Friends and Family: Not on file    Attends [employer] Services: Not on file    Active Member of [address] [place] [address] or Organizations: Not on file    Attends Club or Organization Meetings: Not on file    Marital Status: Not on file   Intimate Partner Violence:     Fear of Current or Ex-Partner: Not on file    Emotionally Abused: Not on file    Physically Abused: Not on file    Sexually Abused: Not on file   Housing Stability:     Unable to Pay for Housing in the Last Year: Not on file    Number of [place] in the Last Year: Not on file    Unstable Housing in the Last Year: Not on file       Review of Systems :    Constitutional: Negative for fatigue, malaise, fever, chills, appetite change and unexpected weight change. HENT: Negative for congestion, ear discharge, ear pain, facial swelling, mouth sores, postnasal drip, rhinorrhea, sinus pressure, sore throat, tinnitus and trouble swallowing. Eyes: Negative for vision changes, double vision, pain  Respiratory: Negative for cough, chest tightness, shortness of breath, chest heaviness, pleuritic pain,  wheezing.    Cardiovascular: Negative for diaphoresis, chest pain, palpitations, or edema   Gastrointestinal: Negative for nausea, vomiting, abdominal pain, diarrhea, constipation, blood in stool, melena, changes in bowel habits, abdominal distention, anal bleeding and rectal pain. Endocrine: Negative for polydipsia, polyphagia and polyuria. No heat or cold intolerance. Genitourinary: Negative for dysuria, urgency, frequency, hematuria, difficulty urinating, nocturia. Musculoskeletal: Negative for myalgias, back pain, joint swelling and arthralgias. No gait disturbance. Skin: Negative for rash, lesions, hair or nail changes. Allergic/Immunologic: Negative for environmental allergies and food allergies. Neurological: Negative for dizziness, weakness, tremors, syncope, speech difficulty, light-headedness, bowel or bladder control changes, numbness and headaches. Hematological: Negative for adenopathy. Does not bruise/bleed easily. Psychiatric/Behavioral: Negative for suicidal ideas, behavioral problems, sleep disturbance and decreased concentration. Unless otherwise stated in this report or unable to obtain because of the patient's clinical or mental status as evidenced by the medical record, this patients's positive and negative responses for Review of Systems, constitutional, psych, eyes, ENT, cardiovascular, respiratory, gastrointestinal, neurological, genitourinary, musculoskeletal, integument systems and systems related to the presenting problem are either stated in the preceding or were not pertinent or were negative for the symptoms and/or complaints related to the medical problem. Physical Exam:   Vitals:    03/11/22 0924   BP: 118/64   Pulse: 84   Temp: 97.5 °F (36.4 °C)   Weight: 139 lb (63 kg)     Constitutional:  A and O x 3, in NAD. Afebrile. Appears stated age. Head:  NCAT. Eyes:  PERRLA, EOMI without nystagmus. Conjunctiva normal. Sclera anicteric. Neck:  Supple. Nontender, no lymphadenopathy noted, no thyromegaly. Lungs:  Clear to auscultation and breath sounds equal.  Heart:  Regular rate and rhythm, normal S1 and S2, no m/r/g. Abdomen:  Soft, NTND, NABS x 4, no masses or organomegaly, no rebound or guarding. MS: Normal, painless range of motion. No neurovascular deficit. 5/5 strength in UE's/LE's/ bilaterally. Skin:  No abrasions, ecchymoses, or lacerations unless noted elsewhere. Extremities  No cyanosis, clubbing, or edema noted. Neurological:   Oriented. Motor functions intact. Psych: pleasant, normal affect, appropriate thoughts and insight. Assessment/Plan:     Marge Church was seen today for hyperlipidemia. Diagnoses and all orders for this visit:    SUZANNE (generalized anxiety disorder)  -     venlafaxine (EFFEXOR XR) 37.5 MG extended release capsule; Take 1 capsule by mouth daily  Hot flash, menopausal  -     venlafaxine (EFFEXOR XR) 37.5 MG extended release capsule; Take 1 capsule by mouth daily  -     CBC; Future   Pt is to go to 25mg of sertraline for 2 weeks, start the effxo rin 1 week (watch for serotonin overload and seek care if occurs, she understands). FU 1 month for this, via phone. Sooner if needed. Encounter for screening for COVID-19  -     Covid-19, Antibody; Future    Pure hypercholesterolemia  -     Comprehensive Metabolic Panel; Future  -     CBC; Future  -     LIPID PANEL; Future    Encounter for screening colonoscopy  -     Georges Calderón MD, General Surgery, [place])    Family history of colon cancer  -     Georges Calderón MD, General Surgery, [place]    Vitamin D deficiency  -     Vitamin D 25 Hydroxy; Future        Return in about 6 months (around 9/11/2022). Counseled regarding above diagnosis, including possible risks and complications,  especially if left uncontrolled. Counseled regarding the possible side effects, risks, benefits and alternatives to treatment; patient and/or guardian verbalizes understanding, agrees, feels comfortable with and wishes to proceed with above treatment plan.      Advised patient to call with any new medication issues, and read all Rx info from pharmacy to assure aware of all possible risks and side effects of medication before taking. Reviewed age and gender appropriate health screening exams and vaccinations. Advised patient regarding importance of keeping up with recommended health maintenance and to schedule as soon as possible if overdue, as this is important in assessing for undiagnosed pathology, especially cancer, as well as protecting against potentially harmful/life threatening disease. Patient and/or guardian verbalizes understanding and agrees with above counseling, assessment and plan. All questions answered.     Luberta Babinski, PA-C

## 2022-03-16 DIAGNOSIS — Z11.52 ENCOUNTER FOR SCREENING FOR COVID-19: ICD-10-CM

## 2022-03-16 DIAGNOSIS — E55.9 VITAMIN D DEFICIENCY: ICD-10-CM

## 2022-03-16 DIAGNOSIS — N95.1 HOT FLASH, MENOPAUSAL: ICD-10-CM

## 2022-03-16 DIAGNOSIS — E78.00 PURE HYPERCHOLESTEROLEMIA: ICD-10-CM

## 2022-03-16 LAB
ALBUMIN SERPL-MCNC: 4.5 G/DL (ref 3.5–5.2)
ALP BLD-CCNC: 64 U/L (ref 35–104)
ALT SERPL-CCNC: 20 U/L (ref 0–32)
ANION GAP SERPL CALCULATED.3IONS-SCNC: 9 MMOL/L (ref 7–16)
AST SERPL-CCNC: 25 U/L (ref 0–31)
BILIRUB SERPL-MCNC: 0.3 MG/DL (ref 0–1.2)
BUN BLDV-MCNC: 15 MG/DL (ref 6–20)
CALCIUM SERPL-MCNC: 9.6 MG/DL (ref 8.6–10.2)
CHLORIDE BLD-SCNC: 102 MMOL/L (ref 98–107)
CHOLESTEROL, TOTAL: 208 MG/DL (ref 0–199)
CO2: 28 MMOL/L (ref 22–29)
CREAT SERPL-MCNC: 0.8 MG/DL (ref 0.5–1)
GFR AFRICAN AMERICAN: >60
GFR NON-AFRICAN AMERICAN: >60 ML/MIN/1.73
GLUCOSE BLD-MCNC: 83 MG/DL (ref 74–99)
HCT VFR BLD CALC: 41.7 % (ref 34–48)
HDLC SERPL-MCNC: 52 MG/DL
HEMOGLOBIN: 13.7 G/DL (ref 11.5–15.5)
LDL CHOLESTEROL CALCULATED: 134 MG/DL (ref 0–99)
MCH RBC QN AUTO: 30.7 PG (ref 26–35)
MCHC RBC AUTO-ENTMCNC: 32.9 % (ref 32–34.5)
MCV RBC AUTO: 93.5 FL (ref 80–99.9)
PDW BLD-RTO: 12.4 FL (ref 11.5–15)
PLATELET # BLD: 344 E9/L (ref 130–450)
PMV BLD AUTO: 10 FL (ref 7–12)
POTASSIUM SERPL-SCNC: 4.3 MMOL/L (ref 3.5–5)
RBC # BLD: 4.46 E12/L (ref 3.5–5.5)
SARS-COV-2 ANTIBODY, TOTAL: REACTIVE
SODIUM BLD-SCNC: 139 MMOL/L (ref 132–146)
TOTAL PROTEIN: 7.4 G/DL (ref 6.4–8.3)
TRIGL SERPL-MCNC: 109 MG/DL (ref 0–149)
VITAMIN D 25-HYDROXY: 89 NG/ML (ref 30–100)
VLDLC SERPL CALC-MCNC: 22 MG/DL
WBC # BLD: 9 E9/L (ref 4.5–11.5)

## 2022-04-18 ENCOUNTER — OFFICE VISIT (OUTPATIENT)
Dept: PRIMARY CARE CLINIC | Age: 57
End: 2022-04-18
Payer: COMMERCIAL

## 2022-04-18 VITALS — HEART RATE: 87 BPM | SYSTOLIC BLOOD PRESSURE: 120 MMHG | TEMPERATURE: 97.9 F | DIASTOLIC BLOOD PRESSURE: 73 MMHG

## 2022-04-18 DIAGNOSIS — R42 DIZZINESS: ICD-10-CM

## 2022-04-18 DIAGNOSIS — H61.21 IMPACTED CERUMEN OF RIGHT EAR: Primary | ICD-10-CM

## 2022-04-18 PROCEDURE — 99213 OFFICE O/P EST LOW 20 MIN: CPT | Performed by: NURSE PRACTITIONER

## 2022-04-18 RX ORDER — MECLIZINE HCL 12.5 MG/1
12.5 TABLET ORAL 3 TIMES DAILY PRN
Qty: 15 TABLET | Refills: 0 | Status: SHIPPED
Start: 2022-04-18 | End: 2022-04-25 | Stop reason: SDUPTHER

## 2022-04-18 NOTE — PROGRESS NOTES
Chief Complaint:   Tinnitus (hearing issue w/right ear) and Dizziness      History of Present Illness   Source of history provided by:  patient. Juan Miguel Ortega is a 64 y.o. old female with a past medical history of:   Past Medical History:   Diagnosis Date    Pure hypercholesterolemia 7/27/2016          Pt  presents to the [facility] care with dizziness/right ear ringing for the past few days. No fever noted. Pt does have a h/o cerumen impaction as well as OM  Denies any N/V/D, abdominal pain, CP, progressive SOB, dizziness, or lethargy. ROS    Unless otherwise stated in this report or unable to obtain because of the patient's clinical or mental status as evidenced by the medical record, this patients's positive and negative responses for Review of Systems, constitutional, psych, eyes, ENT, cardiovascular, respiratory, gastrointestinal, neurological, genitourinary, musculoskeletal, integument systems and systems related to the presenting problem are either stated in the preceding or were not pertinent or were negative for the symptoms and/or complaints related to the medical problem. Past Surgical History:  has a past surgical history that includes Cholecystectomy and Breast surgery (Left). Social History:  reports that she has never smoked. She has never used smokeless tobacco. She reports current alcohol use. She reports that she does not use drugs. Family History: family history is not on file. Allergies: Patient has no known allergies. Physical Exam         VS:  /73   Pulse 87   Temp 97.9 °F (36.6 °C) (Temporal)   LMP 03/20/2019 (Approximate)    Oxygen Saturation Interpretation: Normal.    Constitutional:  Alert, development consistent with age. Ears:  External Ears: Normal bilateral pinna. TM's & External Canals: RIGHT: CERUMEN IMPACTION PRESENT. LEFT:UNREMARKABLE.   APPEND: ATTEMPTED TO REMOVE CERUMEN VIA WATER IRRIGATION/FLUSH-ONLY SMALL AMOUNT REMOVED  Nose: There is no obvious septal defect. Mouth:  Moist bucca mucosa and normal tongue. Throat: no posterior pharyngeal erythema,t exudates or lesions. Neck:  Supple. There is no obvious adenopathy or neck tenderness. Lungs:   Breath sounds: Normal chest expansion and breath sounds noted throughout. NO wheezes, rales, or rhonchi noted. Heart:  Regular rate and rhythm, normal heart sounds, without pathological murmurs, ectopy, gallops, or rubs. Skin:  Normal turgor. Warm, dry, without visible rash. Neurological:  Oriented. Motor functions intact. Lab / Imaging Results   (All laboratory and radiology results have been personally reviewed by myself)  Labs:  No results found for this visit on 04/18/22. Imaging: All Radiology results interpreted by Radiologist unless otherwise noted. No orders to display         Assessment / Plan     Impression(s):  1. Impacted cerumen of right ear    2.  Dizziness      Disposition:  Disposition: Advised to start Debrox as discussed, Meclizine as needed for dizziness, advised on sedating effects, return to [facility] in the next 3 days to have cerumen removed

## 2022-04-20 ENCOUNTER — OFFICE VISIT (OUTPATIENT)
Dept: PRIMARY CARE CLINIC | Age: 57
End: 2022-04-20
Payer: COMMERCIAL

## 2022-04-20 VITALS
DIASTOLIC BLOOD PRESSURE: 67 MMHG | TEMPERATURE: 98.8 F | HEART RATE: 91 BPM | SYSTOLIC BLOOD PRESSURE: 104 MMHG | RESPIRATION RATE: 16 BRPM | OXYGEN SATURATION: 97 %

## 2022-04-20 DIAGNOSIS — H61.21 IMPACTED CERUMEN OF RIGHT EAR: Primary | ICD-10-CM

## 2022-04-20 PROCEDURE — 99213 OFFICE O/P EST LOW 20 MIN: CPT | Performed by: NURSE PRACTITIONER

## 2022-04-20 NOTE — PROGRESS NOTES
Chief Complaint:   Cerumen Impaction (right)      History of Present Illness   Source of history provided by:  patient. Yoselyn Harper is a 64 y.o. old female with a past medical history of:   Past Medical History:   Diagnosis Date    Pure hypercholesterolemia 7/27/2016         Pt presents to the ready care for right ear cerumen removal. Pt was seen 2 days ago and started on Debrox after unsuccessful attempt to remove cerumen. Pt has been compliant with Debrox as ordered. Pt has been experiencing tinnitus and dizziness as well. Denies any N/V/D, congestion,  CP, progressive SOB, or lethargy. ROS    Unless otherwise stated in this report or unable to obtain because of the patient's clinical or mental status as evidenced by the medical record, this patients's positive and negative responses for Review of Systems, constitutional, psych, eyes, ENT, cardiovascular, respiratory, gastrointestinal, neurological, genitourinary, musculoskeletal, integument systems and systems related to the presenting problem are either stated in the preceding or were not pertinent or were negative for the symptoms and/or complaints related to the medical problem. Past Surgical History:  has a past surgical history that includes Cholecystectomy and Breast surgery (Left). Social History:  reports that she has never smoked. She has never used smokeless tobacco. She reports current alcohol use. She reports that she does not use drugs. Family History: family history is not on file. Allergies: Patient has no known allergies. Physical Exam         VS:  /67 (Site: Right Upper Arm, Position: Sitting, Cuff Size: Medium Adult)   Pulse 91   Temp 98.8 °F (37.1 °C) (Temporal)   Resp 16   LMP 03/20/2019 (Approximate)   SpO2 97%    Oxygen Saturation Interpretation: Normal.    Constitutional:  Alert, development consistent with age. Ears:  External Ears: Normal bilateral pinna.              TM's & External Canals: RIGHT: CERUMEN IMPACTION PRESENT. APPEND: CERUMEN SUCCESSFULLY REMOVED VIA WATER IRRIGATION/FLUSH W/O DIFFICULTY, RIGHT TM AND CANAL ARE UNREMARKABLE  Throat: Airway patent  Neck:  Supple. There is no obvious adenopathy or neck tenderness. Lungs:   Breath sounds: Normal chest expansion and breath sounds noted   Heart:  Regular rate and rhythm, normal heart sounds, without pathological murmurs, ectopy, gallops, or rubs. Skin:  Normal turgor. Warm, dry, without visible rash. Neurological:  Oriented. Motor functions intact. Lab / Imaging Results   (All laboratory and radiology results have been personally reviewed by myself)  Labs:  No results found for this visit on 04/20/22. Imaging: All Radiology results interpreted by Radiologist unless otherwise noted. No orders to display         Assessment / Plan     Impression(s):  1.  Impacted cerumen of right ear      Disposition:  Disposition: Successful removal of cerumen, no further treatment needed

## 2022-04-25 DIAGNOSIS — R42 DIZZINESS: ICD-10-CM

## 2022-04-25 RX ORDER — MECLIZINE HCL 12.5 MG/1
12.5 TABLET ORAL 3 TIMES DAILY PRN
Qty: 60 TABLET | Refills: 0 | Status: SHIPPED | OUTPATIENT
Start: 2022-04-25 | End: 2022-05-25

## 2022-04-29 ENCOUNTER — PATIENT MESSAGE (OUTPATIENT)
Dept: PRIMARY CARE CLINIC | Age: 57
End: 2022-04-29

## 2022-04-29 DIAGNOSIS — F41.1 GAD (GENERALIZED ANXIETY DISORDER): ICD-10-CM

## 2022-04-29 DIAGNOSIS — N95.1 HOT FLASH, MENOPAUSAL: ICD-10-CM

## 2022-04-29 RX ORDER — VENLAFAXINE HYDROCHLORIDE 37.5 MG/1
37.5 CAPSULE, EXTENDED RELEASE ORAL DAILY
Qty: 30 CAPSULE | Refills: 2 | Status: SHIPPED
Start: 2022-04-29 | End: 2022-05-26

## 2022-04-29 NOTE — TELEPHONE ENCOUNTER
From: Garth Vickers  To: Lakisha Gabby  Sent: 4/29/2022 1:39 PM EDT  Subject: Medication     Hello   You started me on the Effexor a few weeks ago and it has worked amazing! ! My hot flashes are under control and I couldnt be more happy! I was hoping you could send me refills?    Thank you

## 2022-05-05 RX ORDER — PITAVASTATIN CALCIUM 4.18 MG/1
TABLET, FILM COATED ORAL
Qty: 90 TABLET | Refills: 3 | Status: SHIPPED | OUTPATIENT
Start: 2022-05-05

## 2022-05-09 DIAGNOSIS — H91.91 ACUTE HEARING LOSS, RIGHT: Primary | ICD-10-CM

## 2022-05-10 ENCOUNTER — TELEPHONE (OUTPATIENT)
Dept: ENT CLINIC | Age: 57
End: 2022-05-10

## 2022-05-10 NOTE — TELEPHONE ENCOUNTER
Called Pt to move appt. Sooner and Pt found a sooner appt at [address] and cancelled appt. Here.

## 2022-05-10 NOTE — TELEPHONE ENCOUNTER
Patient is being referred for Acute hearing loss, right- I scheduled for first available She is asking for the appointment to be moved to sooner date and time if able. Please contact patient if this can be rescheduled.  Thank you

## 2022-05-18 ENCOUNTER — TELEPHONE (OUTPATIENT)
Dept: ENT CLINIC | Age: 57
End: 2022-05-18

## 2022-05-18 NOTE — TELEPHONE ENCOUNTER
Pt called and requesting an appointment with Nancy Carreno for a second opinion for sudden hearing loss.

## 2022-05-19 NOTE — TELEPHONE ENCOUNTER
Called pt back and she wants a second opinion on her hearing problem. Saw someone else but wants the second opinion.  Moved up appointment for unilateral hearing loss per patient

## 2022-05-24 ENCOUNTER — PROCEDURE VISIT (OUTPATIENT)
Dept: AUDIOLOGY | Age: 57
End: 2022-05-24
Payer: COMMERCIAL

## 2022-05-24 ENCOUNTER — OFFICE VISIT (OUTPATIENT)
Dept: ENT CLINIC | Age: 57
End: 2022-05-24
Payer: COMMERCIAL

## 2022-05-24 VITALS
WEIGHT: 139 LBS | BODY MASS INDEX: 25.58 KG/M2 | SYSTOLIC BLOOD PRESSURE: 118 MMHG | HEIGHT: 62 IN | DIASTOLIC BLOOD PRESSURE: 72 MMHG

## 2022-05-24 DIAGNOSIS — H81.311 VERTIGO, AURAL, RIGHT: ICD-10-CM

## 2022-05-24 DIAGNOSIS — H93.11 TINNITUS OF RIGHT EAR: ICD-10-CM

## 2022-05-24 DIAGNOSIS — H91.21 SUDDEN RIGHT HEARING LOSS: ICD-10-CM

## 2022-05-24 DIAGNOSIS — H90.41 SENSORINEURAL HEARING LOSS (SNHL) OF RIGHT EAR WITH UNRESTRICTED HEARING OF LEFT EAR: ICD-10-CM

## 2022-05-24 DIAGNOSIS — H69.83 DYSFUNCTION OF BOTH EUSTACHIAN TUBES: ICD-10-CM

## 2022-05-24 DIAGNOSIS — H91.8X9 ASYMMETRICAL HEARING LOSS: Primary | ICD-10-CM

## 2022-05-24 DIAGNOSIS — H69.81 DYSFUNCTION OF RIGHT EUSTACHIAN TUBE: ICD-10-CM

## 2022-05-24 PROCEDURE — 92567 TYMPANOMETRY: CPT | Performed by: AUDIOLOGIST

## 2022-05-24 PROCEDURE — 99204 OFFICE O/P NEW MOD 45 MIN: CPT | Performed by: NURSE PRACTITIONER

## 2022-05-24 RX ORDER — PREDNISONE 20 MG/1
60 TABLET ORAL DAILY
Qty: 30 TABLET | Refills: 0 | Status: SHIPPED | OUTPATIENT
Start: 2022-05-24 | End: 2022-06-03

## 2022-05-24 ASSESSMENT — ENCOUNTER SYMPTOMS
RESPIRATORY NEGATIVE: 1
SINUS PRESSURE: 0
RHINORRHEA: 0
EYES NEGATIVE: 1
SHORTNESS OF BREATH: 0
SINUS PAIN: 0
STRIDOR: 0

## 2022-05-24 NOTE — PROGRESS NOTES
This patient was referred for tympanometric testing by Verner Cea, APRN-CNP due to a sudden hearing loss, right ear, that was accompanied by vertigo and tinnitus. Tympanometry revealed significant negative middle ear peak pressure, right ear and normal middle ear peak pressure, left ear. Compliance was normal, bilaterally. Ipsilateral acoustic reflexes were absent, bilaterally at 1000Hz. The results were reviewed with the patient. Recommendations for follow up will be made pending physician consult.     Jane Griffin CCC/LION  Audiologist  [id number]  NPI#:  [id number]

## 2022-05-24 NOTE — PROGRESS NOTES
[facility] Otolaryngology  Dr. Saqib Rose D.O. Ms.Ed. New Consult       Patient Name:  Keith Javed  :  1965     CHIEF C/O:    Chief Complaint   Patient presents with    New Patient     patient states that  she was dizzy all day, upon waking the next day realized that she was unable to  hear out of her right ear. was flushed in the office but was unable to get it out. came back to the office and was still unable to hear out of the right ear. spoke with PCP and was told to try claritin and flonase for two weeks. states that she called the office and was seen at [facility] and was unhappy with experience. HX of tubes. was told that there was no hearing in the ear and there     Other     was nothing that could be done about it. was dismissed. HISTORY OBTAINED FROM:  patient    HISTORY OF PRESENT ILLNESS:       Daily Melissa is a 62y.o. year old female, here today for hearing loss in right ear with ringing and dizziness. Symptoms for 1 month  Severe dizziness on , room spinning vertigo  No hearing loss or ringing at onset  Symptoms lasted for several hours  Woke up next day with similar symptoms, noticed she could not hear out of right ear  Seen by Dr. Lis Coker, audio done, asymmetry in right ear, no treatments started, saw 1 month after onset  Continued fullness in the right ear, can hear sound but not words  Persistent tinnitus in the right ear  No hx of hearing loss  Hx of recurrent infections with several sets of tubes as child and adult  Right ear pops frequently  Is taking allergy medication daily for past 3-4 weeks, recently started on sudafed  Tried flonase for 2 weeks, no change so stopped.   Has MRI scheduled in 3 days          Past Medical History:   Diagnosis Date    Pure hypercholesterolemia 2016     Past Surgical History:   Procedure Laterality Date    BREAST SURGERY Left     benign lesion    CHOLECYSTECTOMY         Current Outpatient Medications:     LIVALO 4 MG TABS tablet, TAKE 1 TABLET BY MOUTH AT  NIGHT, Disp: 90 tablet, Rfl: 3    venlafaxine (EFFEXOR XR) 37.5 MG extended release capsule, Take 1 capsule by mouth daily, Disp: 30 capsule, Rfl: 2    meclizine (ANTIVERT) 12.5 MG tablet, Take 1 tablet by mouth 3 times daily as needed for Dizziness, Disp: 60 tablet, Rfl: 0    Coenzyme Q10 (COQ10) 200 MG CAPS, Take by mouth, Disp: , Rfl:     b complex vitamins capsule, Take 1 capsule by mouth daily, Disp: , Rfl:     vitamin D (CHOLECALCIFEROL) 1000 UNIT TABS tablet, Take 1,000 Units by mouth daily, Disp: , Rfl:   Patient has no known allergies. Social History     Tobacco Use    Smoking status: Never Smoker    Smokeless tobacco: Never Used   Substance Use Topics    Alcohol use: Yes     Comment: socially    Drug use: No     History reviewed. No pertinent family history. Review of Systems   Constitutional: Negative. Negative for activity change and appetite change. HENT: Positive for hearing loss and tinnitus. Negative for congestion, ear discharge, ear pain, postnasal drip, rhinorrhea, sinus pressure and sinus pain. Eyes: Negative. Respiratory: Negative. Negative for shortness of breath and stridor. Cardiovascular: Negative. Negative for chest pain and palpitations. Endocrine: Negative. Musculoskeletal: Negative. Skin: Negative. Neurological: Positive for dizziness. Hematological: Negative. Psychiatric/Behavioral: Negative. /72   Ht 5' 2\" (1.575 m)   Wt 139 lb (63 kg)   LMP 03/20/2019 (Approximate)   BMI 25.42 kg/m²   Physical Exam  Constitutional:       Appearance: Normal appearance. HENT:      Head: Normocephalic. Right Ear: Ear canal and external ear normal. Decreased hearing noted. Tympanic membrane is retracted. Left Ear: Tympanic membrane, ear canal and external ear normal.      Nose:      Right Turbinates: Pale. Left Turbinates: Pale.       Mouth/Throat:      Lips: Pink.      Mouth: Mucous membranes are moist.      Pharynx: Oropharynx is clear. Eyes:      Conjunctiva/sclera: Conjunctivae normal.      Pupils: Pupils are equal, round, and reactive to light. Cardiovascular:      Rate and Rhythm: Normal rate and regular rhythm. Pulses: Normal pulses. Pulmonary:      Effort: Pulmonary effort is normal. No respiratory distress. Breath sounds: No stridor. Musculoskeletal:         General: Normal range of motion. Cervical back: Normal range of motion. No rigidity. No muscular tenderness. Skin:     General: Skin is warm and dry. Neurological:      General: No focal deficit present. Mental Status: She is alert and oriented to person, place, and time. Psychiatric:         Mood and Affect: Mood normal.         Behavior: Behavior normal.         Thought Content: Thought content normal.         Judgment: Judgment normal.                   Audiogram and tympanogram reviewed with patient. Audiogram reveals 50 dB hearing loss in the right ear with 48% discrimination at 85 dB, 10 dB of hearing loss in the left ear with 96% discrimination at 45 dB. Audiogram is asymmetrical on right. Tympanogram reviewed with patient. Reveals type C curve in the right ear, with type A curve in the left ear. IMPRESSION/PLAN:    [facility]. Diagnoses and all orders for this visit:    Asymmetrical hearing loss    Sensorineural hearing loss (SNHL) of right ear with unrestricted hearing of left ear    Dysfunction of both eustachian tubes    Other orders  -     predniSONE (DELTASONE) 20 MG tablet; Take 3 tablets by mouth daily for 10 days      Outside records reviewed from [facility] at the [facility] including physician note and audiogram.  Patient will be placed on prednisone, 60 mg once daily for 10 days for right-sided asymmetrical hearing loss occurring within the last 6 weeks.   She will also be placed on Flonase, 2 sprays each nostril once daily for ongoing right-sided eustachian tube dysfunction and significant retraction of the right TM. Audiogram is reviewed with the patient showing asymmetrical hearing loss of the right ear which combined with her symptoms and onset may be consistent with Ménière's disease pattern. It is recommended that she adhere to a low-sodium high potassium diet at this time. She is currently scheduled for an MRI in 3 days which she will complete with results sent to the office. She will follow-up in 2 weeks for reevaluation. She is instructed to call with any new or worsening symptoms prior to her next appointment.         HUDSON Cohen, FNP-C  8 [facility], Nose and Throat    The information contained in this note has been dictated using drug and medical speech recognition software and may contain errors

## 2022-05-26 DIAGNOSIS — F41.1 GAD (GENERALIZED ANXIETY DISORDER): ICD-10-CM

## 2022-05-26 DIAGNOSIS — N95.1 HOT FLASH, MENOPAUSAL: ICD-10-CM

## 2022-05-26 RX ORDER — VENLAFAXINE HYDROCHLORIDE 75 MG/1
75 CAPSULE, EXTENDED RELEASE ORAL DAILY
Qty: 90 CAPSULE | Refills: 1 | Status: SHIPPED | OUTPATIENT
Start: 2022-05-26 | End: 2022-10-21

## 2022-05-31 ENCOUNTER — TELEPHONE (OUTPATIENT)
Dept: ENT CLINIC | Age: 57
End: 2022-05-31

## 2022-05-31 DIAGNOSIS — H91.8X9 ASYMMETRICAL HEARING LOSS: Primary | ICD-10-CM

## 2022-05-31 DIAGNOSIS — Z01.818 PREOP TESTING: ICD-10-CM

## 2022-05-31 DIAGNOSIS — H91.21 SUDDEN IDIOPATHIC HEARING LOSS OF RIGHT EAR WITH UNRESTRICTED HEARING OF LEFT EAR: ICD-10-CM

## 2022-05-31 NOTE — TELEPHONE ENCOUNTER
Pt notified, asked for pt call back for details including best date/ time or to give pt radiology scheduling number [phone].

## 2022-05-31 NOTE — TELEPHONE ENCOUNTER
Order for MRI of the IAC posterior fossa with and without contrast placed. BUN/creatinine are ordered prior to the exam.  Please notify patient.

## 2022-05-31 NOTE — TELEPHONE ENCOUNTER
Pt is a recent new patient from the [facility] [facility]. They had ordered a MRI. She would for Varsha Mooney to order the MRI so that she doesn't have to go through the [facility] [facility] anymore. It would need to be faxed to her at [phone]. Please advise.

## 2022-06-01 ENCOUNTER — TELEPHONE (OUTPATIENT)
Dept: ENT CLINIC | Age: 57
End: 2022-06-01

## 2022-06-01 NOTE — TELEPHONE ENCOUNTER
Pt called back, notified her of MRI order placed as well as labs, pt has radiology scheduling number, notified pt office visit to go over results with Jef Hugo NP is 7/1/22.

## 2022-06-07 DIAGNOSIS — Z01.812 PRE-OPERATIVE LABORATORY EXAMINATION: Primary | ICD-10-CM

## 2022-06-23 DIAGNOSIS — Z01.812 PRE-OPERATIVE LABORATORY EXAMINATION: ICD-10-CM

## 2022-06-23 LAB
BUN BLDV-MCNC: 16 MG/DL (ref 6–20)
CREAT SERPL-MCNC: 0.8 MG/DL (ref 0.5–1)
GFR AFRICAN AMERICAN: >60
GFR NON-AFRICAN AMERICAN: >60 ML/MIN/1.73

## 2022-06-24 ENCOUNTER — HOSPITAL ENCOUNTER (OUTPATIENT)
Dept: MRI IMAGING | Age: 57
Discharge: HOME | End: 2022-06-26
Payer: COMMERCIAL

## 2022-06-24 DIAGNOSIS — H91.21 SUDDEN IDIOPATHIC HEARING LOSS OF RIGHT EAR WITH UNRESTRICTED HEARING OF LEFT EAR: ICD-10-CM

## 2022-06-24 DIAGNOSIS — H91.8X9 ASYMMETRICAL HEARING LOSS: ICD-10-CM

## 2022-06-24 PROCEDURE — A9579 GAD-BASE MR CONTRAST NOS,1ML: HCPCS | Performed by: RADIOLOGY

## 2022-06-24 PROCEDURE — 70553 MRI BRAIN STEM W/O & W/DYE: CPT

## 2022-06-24 PROCEDURE — 6360000004 HC RX CONTRAST MEDICATION: Performed by: RADIOLOGY

## 2022-06-24 RX ADMIN — GADOTERIDOL 12.6 ML: 279.3 INJECTION, SOLUTION INTRAVENOUS at 11:06

## 2022-07-01 ENCOUNTER — PROCEDURE VISIT (OUTPATIENT)
Dept: AUDIOLOGY | Age: 57
End: 2022-07-01
Payer: COMMERCIAL

## 2022-07-01 ENCOUNTER — OFFICE VISIT (OUTPATIENT)
Dept: ENT CLINIC | Age: 57
End: 2022-07-01
Payer: COMMERCIAL

## 2022-07-01 VITALS — WEIGHT: 139 LBS | HEIGHT: 62 IN | BODY MASS INDEX: 25.58 KG/M2

## 2022-07-01 DIAGNOSIS — H91.8X9 ASYMMETRICAL HEARING LOSS: Primary | ICD-10-CM

## 2022-07-01 DIAGNOSIS — H91.21 SUDDEN HEARING LOSS, RIGHT: Primary | ICD-10-CM

## 2022-07-01 DIAGNOSIS — R42 VERTIGO: ICD-10-CM

## 2022-07-01 DIAGNOSIS — H69.81 DYSFUNCTION OF RIGHT EUSTACHIAN TUBE: ICD-10-CM

## 2022-07-01 PROCEDURE — 99213 OFFICE O/P EST LOW 20 MIN: CPT | Performed by: NURSE PRACTITIONER

## 2022-07-01 PROCEDURE — 92552 PURE TONE AUDIOMETRY AIR: CPT | Performed by: AUDIOLOGIST

## 2022-07-01 PROCEDURE — 92567 TYMPANOMETRY: CPT | Performed by: AUDIOLOGIST

## 2022-07-01 NOTE — PROGRESS NOTES
[facility]  Dr. Giovanna Shah. Daniela Bee. Ms.Ed        Patient Name:  Zeynep Rodriguez  :  1965     CHIEF C/O:    Chief Complaint   Patient presents with    Follow-up     MRI results        HISTORY OBTAINED FROM:  patient    HISTORY OF PRESENT ILLNESS:       Pa Luque is a 62y.o. year old female, here today for follow up of dizziness and asymmetrical hearing loss, MRI results. Last seen 6 weeks ago  MRI IAC no acute findings of the IAC  Hearing remains the same, no changes  No current ringing, constant static sound in right ear  Intermittent high frequency ringing  States she has had no further dizziness after completing steroids  Denies persistent pain or pressure in either ear        Past Medical History:   Diagnosis Date    Pure hypercholesterolemia 2016     Past Surgical History:   Procedure Laterality Date    BREAST SURGERY Left     benign lesion    CHOLECYSTECTOMY         Current Outpatient Medications:     venlafaxine (EFFEXOR XR) 75 MG extended release capsule, Take 1 capsule by mouth daily, Disp: 90 capsule, Rfl: 1    LIVALO 4 MG TABS tablet, TAKE 1 TABLET BY MOUTH AT  NIGHT, Disp: 90 tablet, Rfl: 3    Coenzyme Q10 (COQ10) 200 MG CAPS, Take by mouth, Disp: , Rfl:     b complex vitamins capsule, Take 1 capsule by mouth daily, Disp: , Rfl:     vitamin D (CHOLECALCIFEROL) 1000 UNIT TABS tablet, Take 1,000 Units by mouth daily, Disp: , Rfl:   Patient has no known allergies. Social History     Tobacco Use    Smoking status: Never Smoker    Smokeless tobacco: Never Used   Substance Use Topics    Alcohol use: Yes     Comment: socially    Drug use: No     History reviewed. No pertinent family history. Review of Systems   Constitutional: Negative. Negative for activity change and appetite change. HENT: Positive for hearing loss and tinnitus. Negative for congestion, ear discharge, ear pain, postnasal drip, rhinorrhea, sinus pressure and sinus pain. Eyes: Negative. Respiratory: Negative. Negative for shortness of breath and stridor. Cardiovascular: Negative. Negative for chest pain and palpitations. Endocrine: Negative. Musculoskeletal: Negative. Skin: Negative. Neurological: Negative for dizziness. Hematological: Negative. Psychiatric/Behavioral: Negative. Ht 5' 2\" (1.575 m)   Wt 139 lb (63 kg)   LMP 03/20/2019 (Approximate)   BMI 25.42 kg/m²   Physical Exam  Constitutional:       Appearance: Normal appearance. HENT:      Head: Normocephalic. Right Ear: Ear canal and external ear normal. Decreased hearing noted. Tympanic membrane is retracted. Left Ear: Tympanic membrane, ear canal and external ear normal.      Nose:      Right Turbinates: Pale. Left Turbinates: Pale. Mouth/Throat:      Lips: Pink. Mouth: Mucous membranes are moist.      Pharynx: Oropharynx is clear. Eyes:      Conjunctiva/sclera: Conjunctivae normal.      Pupils: Pupils are equal, round, and reactive to light. Cardiovascular:      Rate and Rhythm: Normal rate and regular rhythm. Pulses: Normal pulses. Pulmonary:      Effort: Pulmonary effort is normal. No respiratory distress. Breath sounds: No stridor. Musculoskeletal:         General: Normal range of motion. Cervical back: Normal range of motion. No rigidity. No muscular tenderness. Skin:     General: Skin is warm and dry. Neurological:      General: No focal deficit present. Mental Status: She is alert and oriented to person, place, and time. Psychiatric:         Mood and Affect: Mood normal.         Behavior: Behavior normal.         Thought Content: Thought content normal.         Judgment: Judgment normal.           Audiogram reviewed with patient showing consistent pattern when compared to previous study of May 2022. Type C curve in the right ear with type a curve in the left ear. MRI IAC:  Impression   1. Unremarkable MRI of the IACs.    2.  No acute intracranial abnormality. 3. Small air-fluid level in the right maxillary sinus.  Clinical correlation   for acute sinusitis is recommended.       RECOMMENDATIONS:   Unavailable       IMPRESSION/PLAN:    Marcus Perdue was seen today for follow-up. Diagnoses and all orders for this visit:    Asymmetrical hearing loss  -     COLLETTE Diaz, Audiology, [place]    Dysfunction of right eustachian tube  -     COLLETTE Diaz, Audiology, [place]    Vertigo      At this time patient will continue with her Flonase, 2 sprays each nostril once daily for ongoing treatment of her right-sided eustachian tube dysfunction. MRI of the IAC is reviewed with the patient with no significant findings of the IAC. At this time she will be cleared from an ENT standpoint to proceed with a hearing aid for her right ear. As she has not had any further symptoms of dizziness since her prior appointment we will continue to manage her symptoms of possible Ménière's disease with diet continue with low sodium low caffeine and high potassium. If symptoms return or persist with the diet we will consider hydrochlorothiazide treatment in the future. Patient agrees with this plan. She will follow-up in 3 months for reevaluation. She is instructed to call with any new or worsening symptoms prior to her next appointment.       Nate Thomas, MSN, FNP-C  8 [facility], Nose and Throat    The information contained in this note has been dictated using drug and medical speech recognition software and may contain errors

## 2022-07-06 ASSESSMENT — ENCOUNTER SYMPTOMS
SINUS PRESSURE: 0
STRIDOR: 0
SHORTNESS OF BREATH: 0
RHINORRHEA: 0
RESPIRATORY NEGATIVE: 1
SINUS PAIN: 0
EYES NEGATIVE: 1

## 2022-07-15 ENCOUNTER — PROCEDURE VISIT (OUTPATIENT)
Dept: AUDIOLOGY | Age: 57
End: 2022-07-15

## 2022-07-15 DIAGNOSIS — H90.41 SENSORINEURAL HEARING LOSS (SNHL) OF RIGHT EAR WITH UNRESTRICTED HEARING OF LEFT EAR: Primary | ICD-10-CM

## 2022-07-15 PROCEDURE — 99999 PR OFFICE/OUTPT VISIT,PROCEDURE ONLY: CPT | Performed by: AUDIOLOGIST

## 2022-07-15 NOTE — PROGRESS NOTES
The patient came in for a hearing aid eval. She wishes to try a VERNA style hearing aid in the right ear. Medical clearance obtained. Patient chose VERNA style. Oticon, Zircon 2 R, Color 93-Reno brown, length 2 speaker, small  tip. Custom molds discussed and patient will try  tip first and then decide if she wants to pursue a custom ear piece. Hearing aid will be ordered and the patient will be contacted once it arrives to set up an appointment. The patient will decided at that time whether she wants to do the payment plan or pay at once.       Electronically signed by Sven Patino on 7/15/2022 at 6:21 PM

## 2022-09-13 ENCOUNTER — TELEPHONE (OUTPATIENT)
Dept: AUDIOLOGY | Age: 57
End: 2022-09-13

## 2022-10-07 ENCOUNTER — OFFICE VISIT (OUTPATIENT)
Dept: ENT CLINIC | Age: 57
End: 2022-10-07
Payer: COMMERCIAL

## 2022-10-07 ENCOUNTER — PROCEDURE VISIT (OUTPATIENT)
Dept: AUDIOLOGY | Age: 57
End: 2022-10-07

## 2022-10-07 VITALS — WEIGHT: 138 LBS | BODY MASS INDEX: 25.4 KG/M2 | HEIGHT: 62 IN

## 2022-10-07 DIAGNOSIS — R09.82 POST-NASAL DRAINAGE: ICD-10-CM

## 2022-10-07 DIAGNOSIS — H91.8X9 ASYMMETRICAL HEARING LOSS: Primary | ICD-10-CM

## 2022-10-07 DIAGNOSIS — H69.83 DYSFUNCTION OF BOTH EUSTACHIAN TUBES: ICD-10-CM

## 2022-10-07 DIAGNOSIS — H91.91 HEARING LOSS OF RIGHT EAR, UNSPECIFIED HEARING LOSS TYPE: Primary | ICD-10-CM

## 2022-10-07 PROCEDURE — 99213 OFFICE O/P EST LOW 20 MIN: CPT | Performed by: NURSE PRACTITIONER

## 2022-10-07 PROCEDURE — 99024 POSTOP FOLLOW-UP VISIT: CPT | Performed by: AUDIOLOGIST

## 2022-10-07 RX ORDER — AZELASTINE 1 MG/ML
SPRAY, METERED NASAL
Qty: 90 ML | Refills: 1 | Status: SHIPPED | OUTPATIENT
Start: 2022-10-07

## 2022-10-07 RX ORDER — FLUTICASONE PROPIONATE 50 MCG
2 SPRAY, SUSPENSION (ML) NASAL DAILY
Qty: 48 G | Refills: 1 | Status: SHIPPED | OUTPATIENT
Start: 2022-10-07

## 2022-10-07 RX ORDER — AZELASTINE 1 MG/ML
1-2 SPRAY, METERED NASAL 2 TIMES DAILY PRN
Qty: 30 ML | Refills: 1 | Status: SHIPPED
Start: 2022-10-07 | End: 2022-10-07

## 2022-10-07 NOTE — PROGRESS NOTES
[id number] [facility] Otolaryngology  Dr. Burke Pate. Sang Mac. Ms.Ed        Patient Name:  Cristian Park  :  1965     CHIEF C/O:    Chief Complaint   Patient presents with    Follow-up     Asymmetrical hearing loss. Patient states there have been no changes with hearing as of today. HISTORY OBTAINED FROM:  patient    HISTORY OF PRESENT ILLNESS:       Negrita Lewis is a 62y.o. year old female, here today for follow up of asymmetrical hearing loss, dizziness. Last seen 3 months ago  No changes to hearing since last appt  Tinnitus remains the same in the right ear, has become less noticeable most of the time  Getting hearing aid today for right ear  No dizziness since last appt  Adhering to low sodium diet with good results, no HCTZ at this time  No ear pain or pressure  States doing well at this time. Using flonase daily for allergy symptoms  Mild increase in PND currently with seasonal changes  No sinus pain or pressure        Past Medical History:   Diagnosis Date    Pure hypercholesterolemia 2016     Past Surgical History:   Procedure Laterality Date    BREAST SURGERY Left     benign lesion    CHOLECYSTECTOMY         Current Outpatient Medications:     venlafaxine (EFFEXOR XR) 75 MG extended release capsule, Take 1 capsule by mouth daily, Disp: 90 capsule, Rfl: 1    LIVALO 4 MG TABS tablet, TAKE 1 TABLET BY MOUTH AT  NIGHT, Disp: 90 tablet, Rfl: 3    Coenzyme Q10 (COQ10) 200 MG CAPS, Take by mouth, Disp: , Rfl:     b complex vitamins capsule, Take 1 capsule by mouth daily, Disp: , Rfl:     vitamin D (CHOLECALCIFEROL) 1000 UNIT TABS tablet, Take 1,000 Units by mouth daily, Disp: , Rfl:   Patient has no known allergies. Social History     Tobacco Use    Smoking status: Never    Smokeless tobacco: Never   Substance Use Topics    Alcohol use: Yes     Comment: socially    Drug use: No     History reviewed. No pertinent family history. Review of Systems   Constitutional: Negative.   Negative for activity change and appetite change. HENT:  Positive for hearing loss, postnasal drip and tinnitus. Negative for congestion, ear discharge, ear pain, rhinorrhea, sinus pressure and sinus pain. Eyes: Negative. Respiratory: Negative. Negative for shortness of breath and stridor. Cardiovascular: Negative. Negative for chest pain and palpitations. Endocrine: Negative. Musculoskeletal: Negative. Skin: Negative. Neurological:  Negative for dizziness. Hematological: Negative. Psychiatric/Behavioral: Negative. Ht 5' 2\" (1.575 m)   Wt 138 lb (62.6 kg)   LMP 03/20/2019 (Approximate)   BMI 25.24 kg/m²   Physical Exam  Constitutional:       Appearance: Normal appearance. HENT:      Head: Normocephalic. Right Ear: Ear canal and external ear normal. Decreased hearing noted. Tympanic membrane is retracted. Left Ear: Tympanic membrane, ear canal and external ear normal.      Nose:      Right Turbinates: Pale. Left Turbinates: Pale. Mouth/Throat:      Lips: Pink. Mouth: Mucous membranes are moist.      Pharynx: Oropharynx is clear. Eyes:      Conjunctiva/sclera: Conjunctivae normal.      Pupils: Pupils are equal, round, and reactive to light. Cardiovascular:      Rate and Rhythm: Normal rate and regular rhythm. Pulses: Normal pulses. Pulmonary:      Effort: Pulmonary effort is normal. No respiratory distress. Breath sounds: No stridor. Musculoskeletal:         General: Normal range of motion. Cervical back: Normal range of motion. No rigidity. No muscular tenderness. Skin:     General: Skin is warm and dry. Neurological:      General: No focal deficit present. Mental Status: She is alert and oriented to person, place, and time. Psychiatric:         Mood and Affect: Mood normal.         Behavior: Behavior normal.         Thought Content:  Thought content normal.         Judgment: Judgment normal.       IMPRESSION/PLAN:      Silas Apley was seen today for follow-up. Diagnoses and all orders for this visit:    Asymmetrical hearing loss    Post-nasal drainage    Dysfunction of both eustachian tubes    Other orders  -     fluticasone (FLONASE) 50 MCG/ACT nasal spray; 2 sprays by Each Nostril route daily  -     Discontinue: azelastine (ASTELIN) 0.1 % nasal spray; 1-2 sprays by Nasal route 2 times daily as needed for Rhinitis Use in each nostril as directed    At this time patient will continue with Flonase, 2 sprays each nostril once daily and be placed on Astelin spray, 1 to 2 sprays each nostril twice daily as needed for postnasal drainage symptoms. She is to proceed to audiology to  her hearing aid for her asymmetrical hearing loss at this time. She will follow-up in 6 months for reevaluation. She is instructed to call with any new or worsening symptoms prior to her next appointment.       HUDSON Pulido, FNP-C  8 [facility], Nose and Throat    The information contained in this note has been dictated using drug and medical speech recognition software and may contain errors

## 2022-10-07 NOTE — PROGRESS NOTES
Fit with monaural  [facility]  hearing aid in the right ear. Instructed in use and care. Gave  battery charger, warranty information and scheduled  30 day check for 11/11/22 @ 11:00 am.  Made following adjustments:  tip. Hearing aid contract/battery warning form reviewed and signed. Hearing aid not paired to phone liliana, patient is not sure if she wants to pair the hearing aid or not. Told her if she does and does not like it she can always unpair it. Patient was satisfied and will follow up on above date, unless problems arise. Billing to be done at follow-up.     Aaron De La Vega, Audiology Intern  Electronically signed by Sven Rowland on 10/7/2022 at 2:43 PM

## 2022-10-10 ENCOUNTER — TELEPHONE (OUTPATIENT)
Dept: AUDIOLOGY | Age: 57
End: 2022-10-10

## 2022-10-10 NOTE — TELEPHONE ENCOUNTER
Patient called and left voicemail that she had a question about hearing aids after fitting. Called back, left voicemail.

## 2022-10-11 ENCOUNTER — TELEPHONE (OUTPATIENT)
Dept: AUDIOLOGY | Age: 57
End: 2022-10-11

## 2022-10-14 ASSESSMENT — ENCOUNTER SYMPTOMS
RESPIRATORY NEGATIVE: 1
EYES NEGATIVE: 1
SINUS PRESSURE: 0
STRIDOR: 0
SHORTNESS OF BREATH: 0
RHINORRHEA: 0
SINUS PAIN: 0

## 2022-10-21 ENCOUNTER — OFFICE VISIT (OUTPATIENT)
Dept: FAMILY MEDICINE CLINIC | Age: 57
End: 2022-10-21
Payer: COMMERCIAL

## 2022-10-21 VITALS
BODY MASS INDEX: 26.54 KG/M2 | WEIGHT: 144.2 LBS | HEART RATE: 104 BPM | SYSTOLIC BLOOD PRESSURE: 129 MMHG | TEMPERATURE: 97.1 F | HEIGHT: 62 IN | DIASTOLIC BLOOD PRESSURE: 79 MMHG | RESPIRATION RATE: 20 BRPM | OXYGEN SATURATION: 94 %

## 2022-10-21 DIAGNOSIS — F41.1 GAD (GENERALIZED ANXIETY DISORDER): Primary | ICD-10-CM

## 2022-10-21 DIAGNOSIS — Z00.00 ROUTINE GENERAL MEDICAL EXAMINATION AT A HEALTH CARE FACILITY: ICD-10-CM

## 2022-10-21 DIAGNOSIS — Z12.31 ENCOUNTER FOR SCREENING MAMMOGRAM FOR BREAST CANCER: ICD-10-CM

## 2022-10-21 DIAGNOSIS — E78.00 PURE HYPERCHOLESTEROLEMIA: ICD-10-CM

## 2022-10-21 DIAGNOSIS — Z11.59 NEED FOR HEPATITIS C SCREENING TEST: ICD-10-CM

## 2022-10-21 DIAGNOSIS — Z11.4 SCREENING FOR HIV WITHOUT PRESENCE OF RISK FACTORS: ICD-10-CM

## 2022-10-21 DIAGNOSIS — E55.9 VITAMIN D DEFICIENCY: ICD-10-CM

## 2022-10-21 DIAGNOSIS — H90.5 SENSORINEURAL HEARING LOSS (SNHL) OF RIGHT EAR, UNSPECIFIED HEARING STATUS ON CONTRALATERAL SIDE: ICD-10-CM

## 2022-10-21 PROCEDURE — 99204 OFFICE O/P NEW MOD 45 MIN: CPT | Performed by: STUDENT IN AN ORGANIZED HEALTH CARE EDUCATION/TRAINING PROGRAM

## 2022-10-21 RX ORDER — PAROXETINE HYDROCHLORIDE 20 MG/1
20 TABLET, FILM COATED ORAL DAILY
Qty: 30 TABLET | Refills: 3 | Status: SHIPPED | OUTPATIENT
Start: 2022-10-21

## 2022-10-21 SDOH — ECONOMIC STABILITY: FOOD INSECURITY: WITHIN THE PAST 12 MONTHS, YOU WORRIED THAT YOUR FOOD WOULD RUN OUT BEFORE YOU GOT MONEY TO BUY MORE.: NEVER TRUE

## 2022-10-21 SDOH — ECONOMIC STABILITY: FOOD INSECURITY: WITHIN THE PAST 12 MONTHS, THE FOOD YOU BOUGHT JUST DIDN'T LAST AND YOU DIDN'T HAVE MONEY TO GET MORE.: NEVER TRUE

## 2022-10-21 ASSESSMENT — PATIENT HEALTH QUESTIONNAIRE - PHQ9
SUM OF ALL RESPONSES TO PHQ QUESTIONS 1-9: 0
SUM OF ALL RESPONSES TO PHQ9 QUESTIONS 1 & 2: 0
SUM OF ALL RESPONSES TO PHQ QUESTIONS 1-9: 0
1. LITTLE INTEREST OR PLEASURE IN DOING THINGS: 0
SUM OF ALL RESPONSES TO PHQ QUESTIONS 1-9: 0
SUM OF ALL RESPONSES TO PHQ QUESTIONS 1-9: 0
2. FEELING DOWN, DEPRESSED OR HOPELESS: 0

## 2022-10-21 ASSESSMENT — SOCIAL DETERMINANTS OF HEALTH (SDOH): HOW HARD IS IT FOR YOU TO PAY FOR THE VERY BASICS LIKE FOOD, HOUSING, MEDICAL CARE, AND HEATING?: NOT HARD AT ALL

## 2022-10-21 ASSESSMENT — LIFESTYLE VARIABLES
HOW MANY STANDARD DRINKS CONTAINING ALCOHOL DO YOU HAVE ON A TYPICAL DAY: PATIENT DOES NOT DRINK
HOW OFTEN DO YOU HAVE A DRINK CONTAINING ALCOHOL: NEVER

## 2022-10-21 ASSESSMENT — ENCOUNTER SYMPTOMS
ABDOMINAL DISTENTION: 0
ABDOMINAL PAIN: 0
WHEEZING: 0
SHORTNESS OF BREATH: 0
COUGH: 0

## 2022-10-21 NOTE — PROGRESS NOTES
Marina Greco (:  1965) is a 62 y.o. female, New Patient established through [facility] , here for evaluation of the following:  Establish Care         ASSESSMENT/PLAN  She is here to establish care, overall doing well, she does have urinary retention from the venlafaxine, she would like to switch to Paxil labs were ordered for her to get prior to her next visit in March, we will just communicate by MyChart or phone if the Paxil is working or not    1. SUZANNE (generalized anxiety disorder)  Chronic, well controlled, also with the venlafaxine has control of some of her perimenopausal symptoms, can stop the venlafaxine and start the paroxetine, did discuss that both of short half-life is they may possibly have some withdrawal especially since paroxetine does not offer the increase in norepinephrine, she endorsed understanding  Patient prescribed SSRI today, discussed side effects including initial nausea, vomiting, diarrhea, stomach pain, insomnia, and the increase possibility of suicidal ideations, discussed when to return to the clinic and/or go to the nearest emergency department, patient was understanding   -     PARoxetine (PAXIL) 20 MG tablet; Take 1 tablet by mouth daily, Disp-30 tablet, R-3Normal  2. Sensorineural hearing loss (SNHL) of right ear, unspecified hearing status on contralateral side  Chronic, sounds like this was some viral induced neuritis, following with ear nose and throat  3. Screening for HIV without presence of risk factors  -     HIV Screen; Future  4. Need for hepatitis C screening test  -     Hepatitis C Antibody; Future  5. Encounter for screening mammogram for breast cancer  -     [facility] DEVORA DIGITAL SCREEN BILATERAL [[id number]]; Future  6. Routine general medical examination at a health care facility  -     CBC with Auto Differential; Future  -     Comprehensive Metabolic Panel; Future  -     TSH; Future  7. Vitamin D deficiency  -     Vitamin D 25 Hydroxy; Future  8.  Pure hypercholesterolemia  -     Lipid Panel; Future  Return in about 5 months (around 3/30/2023) for Follow up chronic disease, schedule mammogram .         Subjective   SUBJECTIVE/OBJECTIVE:  HPI    Patient is here to establish care with new physician. She does see ear nose and throat and was prescribed Flonase and azelastine for asymmetric hearing    She notes she eats healthy. She does not exercise. She is perimemopausal. She thinks effexor is helping. She feels like she has urinary retention due to effexor. She would like to try paxil. Declined preventative screening identified as care gaps unless ordered through this visit    PHQ2/PHQ9  PHQ-2 Score: 0  PHQ-2 Over the past 2 weeks, how often have you been bothered by any of the following problems? Little interest or pleasure in doing things: Not at all  Feeling down, depressed, or hopeless: Not at all  PHQ-2 Score: 0   PHQ-9 Total Score: 0 (10/21/2022  8:16 AM)       Past Medical History:  has a past medical history of Pure hypercholesterolemia. Past Surgical History:  has a past surgical history that includes Cholecystectomy and Breast surgery (Left). Social History:  reports that she has never smoked. She has never used smokeless tobacco. She reports current alcohol use. She reports that she does not use drugs. Family History: family history is not on file. Allergies: Patient has no known allergies.   Medications:   Current Outpatient Medications   Medication Sig Dispense Refill    PARoxetine (PAXIL) 20 MG tablet Take 1 tablet by mouth daily 30 tablet 3    fluticasone (FLONASE) 50 MCG/ACT nasal spray 2 sprays by Each Nostril route daily 48 g 1    azelastine (ASTELIN) 0.1 % nasal spray USE 1 TO 2 SPRAYS IN EACH NOSTRIL TWICE DAILY AS NEEDED AS DIRECTED FOR RHINITIS 90 mL 1    LIVALO 4 MG TABS tablet TAKE 1 TABLET BY MOUTH AT  NIGHT 90 tablet 3    Coenzyme Q10 (COQ10) 200 MG CAPS Take by mouth      b complex vitamins capsule Take 1 capsule by mouth daily vitamin D (CHOLECALCIFEROL) 1000 UNIT TABS tablet Take 1,000 Units by mouth daily       No current facility-administered medications for this visit. Allergies: Patient has no known allergies. Review of Systems   Constitutional:  Negative for chills, fatigue, fever and unexpected weight change. Respiratory:  Negative for cough, shortness of breath and wheezing. Cardiovascular:  Negative for chest pain, palpitations and leg swelling. Gastrointestinal:  Negative for abdominal distention and abdominal pain. Genitourinary:  Negative for dysuria. Musculoskeletal:  Negative for arthralgias. Neurological:  Negative for weakness, light-headedness, numbness and headaches. All other systems reviewed and are negative.        Objective   /79 (Site: Right Upper Arm, Position: Sitting, Cuff Size: Medium Adult)   Pulse (!) 104   Temp 97.1 °F (36.2 °C) (Temporal)   Resp 20   Ht 5' 2\" (1.575 m)   Wt 144 lb 3.2 oz (65.4 kg)   LMP 03/20/2019 (Approximate)   SpO2 94%   BMI 26.37 kg/m²       Lab Results   Component Value Date    LABA1C 5.4 11/21/2017    LABA1C 5.5 11/09/2016    LABA1C 5.4 10/16/2015     Lab Results   Component Value Date    CHOL 208 (H) 03/16/2022    CHOL 174 01/21/2021    CHOL 194 01/09/2020     Lab Results   Component Value Date    TRIG 109 03/16/2022    TRIG 113 01/21/2021    TRIG 111 01/09/2020     Lab Results   Component Value Date    HDL 52 03/16/2022    HDL 54 01/21/2021    HDL 53 01/09/2020     Lab Results   Component Value Date    LDLCALC 134 (H) 03/16/2022    LDLCALC 97 01/21/2021    LDLCALC 119 (H) 01/09/2020     Lab Results   Component Value Date    LABVLDL 22 03/16/2022    LABVLDL 23 01/21/2021    LABVLDL 22 01/09/2020     No results found for: CHOLHDLRATIO   Creatinine   Date Value Ref Range Status   06/23/2022 0.8 0.5 - 1.0 mg/dL Final   03/16/2022 0.8 0.5 - 1.0 mg/dL Final   01/21/2021 0.8 0.5 - 1.0 mg/dL Final       The 10-year ASCVD risk score (Nancy DK, et al., 2019) is: 2.7%    Values used to calculate the score:      Age: 62 years      Sex: Female      Is Non- : No      Diabetic: No      Tobacco smoker: No      Systolic Blood Pressure: 340 mmHg      Is BP treated: No      HDL Cholesterol: 52 mg/dL      Total Cholesterol: 208 mg/dL     Physical Exam  Constitutional:       General: She is not in acute distress. Appearance: Normal appearance. HENT:      Head: Normocephalic and atraumatic. Right Ear: External ear normal.      Nose: Nose normal.      Mouth/Throat:      Mouth: Mucous membranes are moist.   Eyes:      Extraocular Movements: Extraocular movements intact. Conjunctiva/sclera: Conjunctivae normal.   Cardiovascular:      Rate and Rhythm: Normal rate and regular rhythm. Pulses: Normal pulses. Heart sounds: No murmur heard. Pulmonary:      Effort: Pulmonary effort is normal.      Breath sounds: Normal breath sounds. No wheezing. Musculoskeletal:         General: Normal range of motion. Right lower leg: No edema. Left lower leg: No edema. Neurological:      Mental Status: She is alert. Psychiatric:         Mood and Affect: Mood normal.         Behavior: Behavior normal.           An electronic signature was used to authenticate this note. --Christiano Escobar MD       *NOTE: This report was transcribed using voice recognition software. Every effort was made to ensure accuracy; however, inadvertent computerized transcription errors may be present.

## 2022-10-31 ENCOUNTER — TELEPHONE (OUTPATIENT)
Dept: AUDIOLOGY | Age: 57
End: 2022-10-31

## 2022-11-01 ENCOUNTER — TELEPHONE (OUTPATIENT)
Dept: AUDIOLOGY | Age: 57
End: 2022-11-01

## 2022-11-01 NOTE — TELEPHONE ENCOUNTER
Received voicemail from patient with question about hearing aid coverage, through insurance. Called back, voicemail was full.

## 2022-11-11 ENCOUNTER — PROCEDURE VISIT (OUTPATIENT)
Dept: AUDIOLOGY | Age: 57
End: 2022-11-11

## 2022-11-11 DIAGNOSIS — H90.41 SENSORINEURAL HEARING LOSS (SNHL) OF RIGHT EAR WITH UNRESTRICTED HEARING OF LEFT EAR: Primary | ICD-10-CM

## 2022-11-11 PROCEDURE — 99024 POSTOP FOLLOW-UP VISIT: CPT | Performed by: AUDIOLOGIST

## 2022-11-11 NOTE — PROGRESS NOTES
The patient came in for a 30 day hearing aid check. She does not wish to keep the hearing aid as not enough benefit was not perceived. Hearing aid will be returned for credit. CROS hearing aid discussed. She will consider a trial next year after her annual hearing evaluation.       Electronically signed by Sven Chen on 11/11/2022 at 11:36 AM

## 2023-01-19 DIAGNOSIS — F41.1 GAD (GENERALIZED ANXIETY DISORDER): ICD-10-CM

## 2023-01-20 DIAGNOSIS — F41.1 GAD (GENERALIZED ANXIETY DISORDER): ICD-10-CM

## 2023-01-20 RX ORDER — PAROXETINE HYDROCHLORIDE 20 MG/1
20 TABLET, FILM COATED ORAL DAILY
Qty: 30 TABLET | Refills: 6 | Status: SHIPPED
Start: 2023-01-20 | End: 2023-02-20 | Stop reason: SDUPTHER

## 2023-01-20 RX ORDER — PAROXETINE HYDROCHLORIDE 20 MG/1
20 TABLET, FILM COATED ORAL DAILY
Qty: 30 TABLET | Refills: 6 | OUTPATIENT
Start: 2023-01-20

## 2023-02-19 ENCOUNTER — PATIENT MESSAGE (OUTPATIENT)
Dept: FAMILY MEDICINE CLINIC | Age: 58
End: 2023-02-19

## 2023-02-19 DIAGNOSIS — F41.1 GAD (GENERALIZED ANXIETY DISORDER): ICD-10-CM

## 2023-02-20 RX ORDER — PAROXETINE 30 MG/1
30 TABLET, FILM COATED ORAL DAILY
Qty: 90 TABLET | Refills: 3 | Status: SHIPPED | OUTPATIENT
Start: 2023-02-20

## 2023-02-20 NOTE — TELEPHONE ENCOUNTER
From: Eliceo Nova  To: Dr. Debbie Kemp  Sent: 2/19/2023 1:45 PM EST  Subject: Paxil    Hello   I do not have an appointment until April 21. The last visit I had doctor said if I needed to increase my Paxil to let her know. The Paxil was helping my hot flashes really good but now they are starting up again. I want to know if you will increase the Paxil? Thank you for your time.  Joseline Noonan

## 2023-04-20 DIAGNOSIS — Z11.59 NEED FOR HEPATITIS C SCREENING TEST: ICD-10-CM

## 2023-04-20 DIAGNOSIS — E78.00 PURE HYPERCHOLESTEROLEMIA: ICD-10-CM

## 2023-04-20 DIAGNOSIS — E55.9 VITAMIN D DEFICIENCY: ICD-10-CM

## 2023-04-20 DIAGNOSIS — Z00.00 ROUTINE GENERAL MEDICAL EXAMINATION AT A HEALTH CARE FACILITY: ICD-10-CM

## 2023-04-20 DIAGNOSIS — Z11.4 SCREENING FOR HIV WITHOUT PRESENCE OF RISK FACTORS: ICD-10-CM

## 2023-04-20 LAB
ALBUMIN SERPL-MCNC: 4.4 G/DL (ref 3.5–5.2)
ALP SERPL-CCNC: 65 U/L (ref 35–104)
ALT SERPL-CCNC: 24 U/L (ref 0–32)
ANION GAP SERPL CALCULATED.3IONS-SCNC: 10 MMOL/L (ref 7–16)
AST SERPL-CCNC: 28 U/L (ref 0–31)
BASOPHILS # BLD: 0.01 E9/L (ref 0–0.2)
BASOPHILS NFR BLD: 0.3 % (ref 0–2)
BILIRUB SERPL-MCNC: 0.4 MG/DL (ref 0–1.2)
BUN SERPL-MCNC: 12 MG/DL (ref 6–20)
CALCIUM SERPL-MCNC: 9.2 MG/DL (ref 8.6–10.2)
CHLORIDE SERPL-SCNC: 104 MMOL/L (ref 98–107)
CHOLESTEROL, TOTAL: 304 MG/DL (ref 0–199)
CO2 SERPL-SCNC: 27 MMOL/L (ref 22–29)
CREAT SERPL-MCNC: 0.8 MG/DL (ref 0.5–1)
EOSINOPHIL # BLD: 0.06 E9/L (ref 0.05–0.5)
EOSINOPHIL NFR BLD: 1.6 % (ref 0–6)
ERYTHROCYTE [DISTWIDTH] IN BLOOD BY AUTOMATED COUNT: 13.2 FL (ref 11.5–15)
GLUCOSE SERPL-MCNC: 90 MG/DL (ref 74–99)
HCT VFR BLD AUTO: 39.9 % (ref 34–48)
HDLC SERPL-MCNC: 61 MG/DL
HGB BLD-MCNC: 12.8 G/DL (ref 11.5–15.5)
IMM GRANULOCYTES # BLD: 0.01 E9/L
IMM GRANULOCYTES NFR BLD: 0.3 % (ref 0–5)
LDLC SERPL CALC-MCNC: 227 MG/DL (ref 0–99)
LYMPHOCYTES # BLD: 0.88 E9/L (ref 1.5–4)
LYMPHOCYTES NFR BLD: 24.1 % (ref 20–42)
MCH RBC QN AUTO: 30.7 PG (ref 26–35)
MCHC RBC AUTO-ENTMCNC: 32.1 % (ref 32–34.5)
MCV RBC AUTO: 95.7 FL (ref 80–99.9)
MONOCYTES # BLD: 0.37 E9/L (ref 0.1–0.95)
MONOCYTES NFR BLD: 10.1 % (ref 2–12)
NEUTROPHILS # BLD: 2.32 E9/L (ref 1.8–7.3)
NEUTS SEG NFR BLD: 63.6 % (ref 43–80)
PLATELET # BLD AUTO: 283 E9/L (ref 130–450)
PMV BLD AUTO: 9.9 FL (ref 7–12)
POTASSIUM SERPL-SCNC: 4 MMOL/L (ref 3.5–5)
PROT SERPL-MCNC: 7.1 G/DL (ref 6.4–8.3)
RBC # BLD AUTO: 4.17 E12/L (ref 3.5–5.5)
SODIUM SERPL-SCNC: 141 MMOL/L (ref 132–146)
TRIGL SERPL-MCNC: 79 MG/DL (ref 0–149)
TSH SERPL-MCNC: 1.83 UIU/ML (ref 0.27–4.2)
VITAMIN D 25-HYDROXY: 63 NG/ML (ref 30–100)
VLDLC SERPL CALC-MCNC: 16 MG/DL
WBC # BLD: 3.7 E9/L (ref 4.5–11.5)

## 2023-04-21 ENCOUNTER — OFFICE VISIT (OUTPATIENT)
Dept: FAMILY MEDICINE CLINIC | Age: 58
End: 2023-04-21
Payer: COMMERCIAL

## 2023-04-21 VITALS
HEART RATE: 77 BPM | OXYGEN SATURATION: 97 % | RESPIRATION RATE: 20 BRPM | BODY MASS INDEX: 27.12 KG/M2 | TEMPERATURE: 97.4 F | DIASTOLIC BLOOD PRESSURE: 64 MMHG | WEIGHT: 147.4 LBS | SYSTOLIC BLOOD PRESSURE: 98 MMHG | HEIGHT: 62 IN

## 2023-04-21 DIAGNOSIS — F41.1 GAD (GENERALIZED ANXIETY DISORDER): ICD-10-CM

## 2023-04-21 DIAGNOSIS — T46.6X5A STATIN MYOPATHY: ICD-10-CM

## 2023-04-21 DIAGNOSIS — E78.00 PURE HYPERCHOLESTEROLEMIA: ICD-10-CM

## 2023-04-21 DIAGNOSIS — G72.0 STATIN MYOPATHY: ICD-10-CM

## 2023-04-21 DIAGNOSIS — E78.01 FAMILIAL HYPERCHOLESTEROLEMIA: Primary | ICD-10-CM

## 2023-04-21 LAB
HCV AB SERPL QL IA: NORMAL
HIV1+2 AB SERPL QL IA: NORMAL

## 2023-04-21 PROCEDURE — 99214 OFFICE O/P EST MOD 30 MIN: CPT | Performed by: STUDENT IN AN ORGANIZED HEALTH CARE EDUCATION/TRAINING PROGRAM

## 2023-04-21 RX ORDER — ONDANSETRON 4 MG/1
4 TABLET, ORALLY DISINTEGRATING ORAL PRN
COMMUNITY
Start: 2023-03-18

## 2023-04-21 RX ORDER — PAROXETINE 30 MG/1
30 TABLET, FILM COATED ORAL DAILY
Qty: 90 TABLET | Refills: 3 | Status: SHIPPED | OUTPATIENT
Start: 2023-04-21

## 2023-04-21 SDOH — ECONOMIC STABILITY: FOOD INSECURITY: WITHIN THE PAST 12 MONTHS, THE FOOD YOU BOUGHT JUST DIDN'T LAST AND YOU DIDN'T HAVE MONEY TO GET MORE.: NEVER TRUE

## 2023-04-21 SDOH — ECONOMIC STABILITY: INCOME INSECURITY: HOW HARD IS IT FOR YOU TO PAY FOR THE VERY BASICS LIKE FOOD, HOUSING, MEDICAL CARE, AND HEATING?: NOT HARD AT ALL

## 2023-04-21 SDOH — ECONOMIC STABILITY: HOUSING INSECURITY
IN THE LAST 12 MONTHS, WAS THERE A TIME WHEN YOU DID NOT HAVE A STEADY PLACE TO SLEEP OR SLEPT IN A SHELTER (INCLUDING NOW)?: NO

## 2023-04-21 SDOH — ECONOMIC STABILITY: FOOD INSECURITY: WITHIN THE PAST 12 MONTHS, YOU WORRIED THAT YOUR FOOD WOULD RUN OUT BEFORE YOU GOT MONEY TO BUY MORE.: NEVER TRUE

## 2023-04-21 ASSESSMENT — PATIENT HEALTH QUESTIONNAIRE - PHQ9
1. LITTLE INTEREST OR PLEASURE IN DOING THINGS: 0
SUM OF ALL RESPONSES TO PHQ QUESTIONS 1-9: 0
SUM OF ALL RESPONSES TO PHQ9 QUESTIONS 1 & 2: 0
2. FEELING DOWN, DEPRESSED OR HOPELESS: 0

## 2023-04-21 ASSESSMENT — ENCOUNTER SYMPTOMS
ABDOMINAL PAIN: 0
WHEEZING: 0
COUGH: 0
ABDOMINAL DISTENTION: 0
SHORTNESS OF BREATH: 0

## 2023-04-21 NOTE — PROGRESS NOTES
Enmanuel Mcdowell (:  1965) is a 62 y.o. female, established patient follow up , here for evaluation of the following:  Anxiety (5 month follow up )         ASSESSMENT/PLAN  Labs reviewed with patient, labs were appropriate to continue current medication doses     1. Familial hypercholesterolemia  Chronic, last LDL was at 227, she does have problems with statins, she has tried atorvastatin, pitivastatn, she is hesitant to try another statin due to the myalgias, we will try to understand her risk more fully with cardiac calcium artery scoring as well as a cardiac IQ testing through [facility] which will check for things such as LDL size, high-sensitivity CRP, APO E2, and other markers that will help us understand her risk in which medication she needs to be on or if diet and lifestyle are enough to help prevent cardiovascular events  -     Miscellaneous Sendout; Future  -     CT CARDIAC CALCIUM SCORING; Future  2. SUZANNE (generalized anxiety disorder)  Chronic, much better controlled now on Paxil, continue current medications  -     PARoxetine (PAXIL) 30 MG tablet; Take 1 tablet by mouth daily, Disp-90 tablet, R-3Normal    4. Statin myopathy  Chronic, with multiple statins, can consider Zetia or Repatha in the future depending on what the cardiac risk labs and imaging show  -     Miscellaneous Sendout; Future  -     CT CARDIAC CALCIUM SCORING; Future  5. Pure hypercholesterolemia  -     Miscellaneous Sendout; Future    Recommended she see podiatry for the left foot pain      Return in 1 year (on 2024) for schedule mamm, Wellness Visit. Subjective   SUBJECTIVE/OBJECTIVE:  HPI  She is here for follow up. She has pan in her left foot into her toes and heel. She notes side is very painful. No swelling bruise. She has tried compression socks and new shoes. She thinks the better shoes does help. She does have increased cholesterol.  She notes the newest on livalo (pitivastati) stated to give her leg

## 2023-04-25 ENCOUNTER — TELEPHONE (OUTPATIENT)
Dept: AUDIOLOGY | Age: 58
End: 2023-04-25

## 2023-04-25 NOTE — TELEPHONE ENCOUNTER
Patient left voicemail requesting to discuss hearing aids. Noted that patient would need updated audio, and that patient is on ENT schedule for yearly audio 5/19/23. Called back, left voicemail.

## 2023-06-09 ENCOUNTER — HOSPITAL ENCOUNTER (OUTPATIENT)
Dept: CT IMAGING | Age: 58
Discharge: HOME | End: 2023-06-09
Payer: COMMERCIAL

## 2023-06-09 DIAGNOSIS — G72.0 STATIN MYOPATHY: ICD-10-CM

## 2023-06-09 DIAGNOSIS — E78.01 FAMILIAL HYPERCHOLESTEROLEMIA: ICD-10-CM

## 2023-06-09 DIAGNOSIS — T46.6X5A STATIN MYOPATHY: ICD-10-CM

## 2023-06-09 PROCEDURE — 75571 CT HRT W/O DYE W/CA TEST: CPT

## 2023-06-16 ENCOUNTER — TELEPHONE (OUTPATIENT)
Dept: AUDIOLOGY | Age: 58
End: 2023-06-16

## 2023-08-16 ENCOUNTER — PROCEDURE VISIT (OUTPATIENT)
Dept: AUDIOLOGY | Age: 58
End: 2023-08-16
Payer: COMMERCIAL

## 2023-08-16 ENCOUNTER — OFFICE VISIT (OUTPATIENT)
Dept: ENT CLINIC | Age: 58
End: 2023-08-16
Payer: COMMERCIAL

## 2023-08-16 VITALS — BODY MASS INDEX: 27.05 KG/M2 | HEIGHT: 62 IN | WEIGHT: 147 LBS

## 2023-08-16 DIAGNOSIS — H91.8X9 ASYMMETRICAL HEARING LOSS: ICD-10-CM

## 2023-08-16 DIAGNOSIS — H91.22 SUDDEN IDIOPATHIC HEARING LOSS OF LEFT EAR WITH RESTRICTED HEARING OF RIGHT EAR: Primary | ICD-10-CM

## 2023-08-16 DIAGNOSIS — H69.81 DYSFUNCTION OF RIGHT EUSTACHIAN TUBE: ICD-10-CM

## 2023-08-16 DIAGNOSIS — H90.A21 SENSORINEURAL HEARING LOSS (SNHL) OF RIGHT EAR WITH RESTRICTED HEARING OF LEFT EAR: Primary | ICD-10-CM

## 2023-08-16 PROCEDURE — 92567 TYMPANOMETRY: CPT | Performed by: AUDIOLOGIST

## 2023-08-16 PROCEDURE — 99214 OFFICE O/P EST MOD 30 MIN: CPT | Performed by: NURSE PRACTITIONER

## 2023-08-16 PROCEDURE — 92553 AUDIOMETRY AIR & BONE: CPT | Performed by: AUDIOLOGIST

## 2023-08-16 RX ORDER — PREDNISONE 20 MG/1
60 TABLET ORAL DAILY
Qty: 30 TABLET | Refills: 0 | Status: SHIPPED | OUTPATIENT
Start: 2023-08-16 | End: 2023-08-26

## 2023-08-16 ASSESSMENT — ENCOUNTER SYMPTOMS
SINUS PAIN: 0
EYES NEGATIVE: 1
SHORTNESS OF BREATH: 0
RESPIRATORY NEGATIVE: 1
STRIDOR: 0
SINUS PRESSURE: 0
RHINORRHEA: 0

## 2023-08-16 NOTE — PROGRESS NOTES
[facility]  Dr. Mina Lewis. Ms.Ed        Patient Name:  Juan Loving  :  1965     CHIEF C/O:    Chief Complaint   Patient presents with    Follow-up     Lt ear sudden HL       HISTORY OBTAINED FROM:  patient    HISTORY OF PRESENT ILLNESS:       Elizabeth John is a 62y.o. year old female, here today for follow up of:       Sudden hearing loss of the left ear. Patient states symptoms started 1 day ago with a noticeable drop in hearing in her left ear. Patient experiencing when symptoms 1 to 2 years ago in the right ear with no return of hearing in the right ear. She did try hearing aid for the right ear and did not tolerate and is scheduled to see Dr. Jossie Weir in the future for possible CI evaluation. She currently denies any ear pain or pressure. She did notice some popping in the left ear prior to her symptoms onset. She denies any dizziness. She denies any drainage from the ear. Patient does have a previous MRI of the IACs that was negative for any findings.          Past Medical History:   Diagnosis Date    Pure hypercholesterolemia 2016     Past Surgical History:   Procedure Laterality Date    BREAST SURGERY Left     benign lesion    CHOLECYSTECTOMY         Current Outpatient Medications:     predniSONE (DELTASONE) 20 MG tablet, Take 3 tablets by mouth daily for 10 days, Disp: 30 tablet, Rfl: 0    Evolocumab (REPATHA) SOSY syringe, Inject 1 mL into the skin every 14 days, Disp: 6 each, Rfl: 3    PARoxetine (PAXIL) 30 MG tablet, Take 1 tablet by mouth daily, Disp: 90 tablet, Rfl: 3    ondansetron (ZOFRAN-ODT) 4 MG disintegrating tablet, Take 1 tablet by mouth as needed, Disp: , Rfl:     fluticasone (FLONASE) 50 MCG/ACT nasal spray, 2 sprays by Each Nostril route daily, Disp: 48 g, Rfl: 1    azelastine (ASTELIN) 0.1 % nasal spray, USE 1 TO 2 SPRAYS IN EACH NOSTRIL TWICE DAILY AS NEEDED AS DIRECTED FOR RHINITIS, Disp: 90 mL, Rfl: 1    b complex vitamins capsule, Take 1

## 2023-08-17 ENCOUNTER — OFFICE VISIT (OUTPATIENT)
Dept: ENT CLINIC | Age: 58
End: 2023-08-17
Payer: COMMERCIAL

## 2023-08-17 VITALS — HEIGHT: 62 IN | WEIGHT: 147 LBS | BODY MASS INDEX: 27.05 KG/M2

## 2023-08-17 DIAGNOSIS — H90.3 BILATERAL SENSORINEURAL HEARING LOSS: Primary | ICD-10-CM

## 2023-08-17 DIAGNOSIS — H91.22 SUDDEN LEFT HEARING LOSS: ICD-10-CM

## 2023-08-17 DIAGNOSIS — H91.8X9 ASYMMETRICAL HEARING LOSS: ICD-10-CM

## 2023-08-17 PROCEDURE — 69801 INCISE INNER EAR: CPT | Performed by: OTOLARYNGOLOGY

## 2023-08-17 PROCEDURE — 99215 OFFICE O/P EST HI 40 MIN: CPT | Performed by: OTOLARYNGOLOGY

## 2023-08-17 NOTE — PROGRESS NOTES
This patient was referred for audiometric and tympanometric testing by JINA Salguero due to sudden hearing loss. Audiometry using pure tone air and bone conduction testing revealed a profound rising to mild sensorineural hearing loss, in the right ear and a moderate rising to mild sensorineural hearing loss, in the left ear. Reliability was good. Tympanometry revealed negative pressure (-202 daPa), in the right and normal middle ear peak pressure and compliance, in the left ear. The results were reviewed with the patient. Recommendations for follow up will be made pending physician consult.       Sven Egan [facility]  [address] [place] [address]   Electronically signed by Sven Egan on 8/17/2023 at 7:43 AM

## 2023-08-17 NOTE — PROGRESS NOTES
NEW PATIENT VISIT  Chief Complaint   Patient presents with    New Patient     NP sudden HL LT ear injection     History of Present Illness:     Cass Milton is a 62 y.o. female presenting with some history of hearing issues and eustachian tube dysfunction with T-tubes and tubes as an adult; she had a history of COVID 2 years ago; she was noted to have sudden right hearing loss (normal MRI) about 1 year ago; she then had a drop in her left hearing 2 days ago.  She has been started on oral steroids and is on board for injections to try and improve hearing function           TOBACCO  Social History     Tobacco Use   Smoking Status Never   Smokeless Tobacco Never        ALCOHOL   Social History     Substance and Sexual Activity   Alcohol Use Yes    Comment: socially        [address]   Social History     Substance and Sexual Activity   Drug Use No        CURRENT OUTPATIENT MEDICATIONS:   Outpatient Medications Marked as Taking for the 8/17/23 encounter (Office Visit) with Christopher Plata MD   Medication Sig Dispense Refill    predniSONE (DELTASONE) 20 MG tablet Take 3 tablets by mouth daily for 10 days 30 tablet 0    Evolocumab (REPATHA) SOSY syringe Inject 1 mL into the skin every 14 days 6 each 3    PARoxetine (PAXIL) 30 MG tablet Take 1 tablet by mouth daily 90 tablet 3    ondansetron (ZOFRAN-ODT) 4 MG disintegrating tablet Take 1 tablet by mouth as needed      fluticasone (FLONASE) 50 MCG/ACT nasal spray 2 sprays by Each Nostril route daily 48 g 1    azelastine (ASTELIN) 0.1 % nasal spray USE 1 TO 2 SPRAYS IN EACH NOSTRIL TWICE DAILY AS NEEDED AS DIRECTED FOR RHINITIS 90 mL 1    b complex vitamins capsule Take 1 capsule by mouth daily      vitamin D (CHOLECALCIFEROL) 1000 UNIT TABS tablet Take 1 tablet by mouth daily          ALLERGIES:   No Known Allergies    Timing Age of Onset this hearing loss 2 days   Duration Increasing in Severity No   Days of work missed in last year n/a      Modifying Factors Seasonal

## 2023-08-30 NOTE — PROGRESS NOTES
HGB 12.8 04/20/2023 08:15 AM    HCT 39.9 04/20/2023 08:15 AM     04/20/2023 08:15 AM    MCV 95.7 04/20/2023 08:15 AM    MCH 30.7 04/20/2023 08:15 AM    MCHC 32.1 04/20/2023 08:15 AM    RDW 13.2 04/20/2023 08:15 AM    NEUTOPHILPCT 63.6 04/20/2023 08:15 AM    LYMPHOPCT 24.1 04/20/2023 08:15 AM    MONOPCT 10.1 04/20/2023 08:15 AM    EOSRELPCT 1.6 04/20/2023 08:15 AM    BASOPCT 0.3 04/20/2023 08:15 AM    NEUTROABS 2.32 04/20/2023 08:15 AM    LYMPHSABS 0.88 (L) 04/20/2023 08:15 AM    EOSABS 0.06 04/20/2023 08:15 AM       On this date 8/31/2023 I have spent 10 minutes reviewing previous notes, test results and 20 min face to face with the patient discussing the diagnosis and importance of compliance with the treatment plan as well as documenting on the day of the visit. Procedure: left trantympanic steroid injection     Risks and benefits were discussed including infection, postoperative bleeding, persistent drainage, persistent perforation, need for further surgery, persistent hearing loss     Procedure: The left ear was visualized with the ear microscope and excessive cerumen was removed. Using the existing perforation, the ear was suctioned and 1 ml of 24mcg/ml decadron was placed without difficulty. The patient was rolled to the right side and laid in that location for 15 minutes without swallowing. The patient tolerated the procedure without complication. Complications: none     EBL: minimal     A/P     Impression              Tanisha Owen is a 62 y.o. female with Bilateral sensorineural hearing loss (right sudden 1 year ago and left recently) confirmed by Anthony Caro, who will benefit from Medical therapy with oral and transtympanic steroids .  The rest of the exam was unremarkable    Plan  Patient will benefit from Fine cut CT scan of the temporal bones without contrast (previous MRI was normal)  Finished oral steroids, good response noted to oral and tympanic steroids  Injection #2

## 2023-08-31 ENCOUNTER — PROCEDURE VISIT (OUTPATIENT)
Dept: AUDIOLOGY | Age: 58
End: 2023-08-31
Payer: COMMERCIAL

## 2023-08-31 ENCOUNTER — OFFICE VISIT (OUTPATIENT)
Dept: ENT CLINIC | Age: 58
End: 2023-08-31
Payer: COMMERCIAL

## 2023-08-31 VITALS — WEIGHT: 147 LBS | HEIGHT: 62 IN | BODY MASS INDEX: 27.05 KG/M2

## 2023-08-31 DIAGNOSIS — H91.8X9 ASYMMETRICAL HEARING LOSS: ICD-10-CM

## 2023-08-31 DIAGNOSIS — H90.3 BILATERAL SENSORINEURAL HEARING LOSS: Primary | ICD-10-CM

## 2023-08-31 DIAGNOSIS — H90.3 SENSORINEURAL HEARING LOSS (SNHL) OF BOTH EARS: Primary | ICD-10-CM

## 2023-08-31 DIAGNOSIS — H91.22 SUDDEN LEFT HEARING LOSS: ICD-10-CM

## 2023-08-31 PROCEDURE — 69801 INCISE INNER EAR: CPT | Performed by: OTOLARYNGOLOGY

## 2023-08-31 PROCEDURE — 99214 OFFICE O/P EST MOD 30 MIN: CPT | Performed by: OTOLARYNGOLOGY

## 2023-08-31 PROCEDURE — 92553 AUDIOMETRY AIR & BONE: CPT

## 2023-08-31 NOTE — PROGRESS NOTES
This patient was referred for audiometric testing by Dr. Marina Brown due to  sudden hearing loss of the left ear . Patient perceives that hearing has gotten better. Audiometry using pure tone air and bone conduction testing revealed a profound rising to mild sensorineural hearing loss and a mild sensorineural hearing loss, in the left ear. Reliability was good. Left ear results showed improvement since last injection. Asymmetries noted across frequency, right ear worse. The results were reviewed with the patient. Recommendations for follow up will be made pending physician consult.     Sven Castaneda/CCC-A  [place] Lic [id number]  Electronically signed by Sven Castaneda on 8/31/2023 at 2:20 PM

## 2023-09-11 ENCOUNTER — HOSPITAL ENCOUNTER (OUTPATIENT)
Dept: CT IMAGING | Age: 58
Discharge: HOME | End: 2023-09-13
Attending: OTOLARYNGOLOGY
Payer: COMMERCIAL

## 2023-09-11 DIAGNOSIS — H91.8X9 ASYMMETRICAL HEARING LOSS: ICD-10-CM

## 2023-09-11 DIAGNOSIS — H91.22 SUDDEN LEFT HEARING LOSS: ICD-10-CM

## 2023-09-11 PROCEDURE — 70480 CT ORBIT/EAR/FOSSA W/O DYE: CPT

## 2023-09-15 LAB — MAMMOGRAPHY, EXTERNAL: NORMAL

## 2023-09-25 ENCOUNTER — PATIENT MESSAGE (OUTPATIENT)
Dept: FAMILY MEDICINE CLINIC | Age: 58
End: 2023-09-25

## 2023-09-25 DIAGNOSIS — F41.1 GAD (GENERALIZED ANXIETY DISORDER): ICD-10-CM

## 2023-09-27 NOTE — TELEPHONE ENCOUNTER
From: Callie Tovar  To: Dr. Raymundo Kimball  Sent: 9/25/2023 6:08 PM EDT  Subject: Paxil     Hello. I want to stop taking my Paxil. I have an appointment with my GYN October 12 to discuss HRT. The Paxil has to many side effects that have been bothersome to me. Let me know how you would like me to taper this.    Thank you

## 2023-09-29 RX ORDER — PAROXETINE HYDROCHLORIDE 20 MG/1
20 TABLET, FILM COATED ORAL DAILY
Qty: 7 TABLET | Refills: 0 | Status: SHIPPED | OUTPATIENT
Start: 2023-09-29

## 2023-09-29 RX ORDER — PAROXETINE 10 MG/1
10 TABLET, FILM COATED ORAL DAILY
Qty: 7 TABLET | Refills: 0 | Status: SHIPPED | OUTPATIENT
Start: 2023-09-29

## 2023-09-29 NOTE — TELEPHONE ENCOUNTER
1. SUZANNE (generalized anxiety disorder)  -     PARoxetine (PAXIL) 20 MG tablet; Take 1 tablet by mouth daily, Disp-7 tablet, R-0Normal  -     PARoxetine (PAXIL) 10 MG tablet;  Take 1 tablet by mouth daily, Disp-7 tablet, R-0Tapering off PaxilNormal    Tapering off Paxil, 1 week at 20, 1 week at 10

## 2023-10-12 ENCOUNTER — OFFICE VISIT (OUTPATIENT)
Dept: ENT CLINIC | Age: 58
End: 2023-10-12
Payer: COMMERCIAL

## 2023-10-12 ENCOUNTER — PROCEDURE VISIT (OUTPATIENT)
Dept: AUDIOLOGY | Age: 58
End: 2023-10-12
Payer: COMMERCIAL

## 2023-10-12 VITALS
HEIGHT: 62 IN | DIASTOLIC BLOOD PRESSURE: 75 MMHG | BODY MASS INDEX: 28.16 KG/M2 | OXYGEN SATURATION: 98 % | HEART RATE: 73 BPM | SYSTOLIC BLOOD PRESSURE: 121 MMHG | WEIGHT: 153 LBS

## 2023-10-12 DIAGNOSIS — H91.8X3 ASYMMETRICAL HEARING LOSS: ICD-10-CM

## 2023-10-12 DIAGNOSIS — H90.3 BILATERAL SENSORINEURAL HEARING LOSS: Primary | ICD-10-CM

## 2023-10-12 DIAGNOSIS — H90.3 SENSORINEURAL HEARING LOSS (SNHL) OF BOTH EARS: Primary | ICD-10-CM

## 2023-10-12 DIAGNOSIS — H91.22 SUDDEN LEFT HEARING LOSS: ICD-10-CM

## 2023-10-12 PROCEDURE — 92626 EVAL AUD FUNCJ 1ST HOUR: CPT

## 2023-10-12 PROCEDURE — 99215 OFFICE O/P EST HI 40 MIN: CPT | Performed by: OTOLARYNGOLOGY

## 2023-10-12 NOTE — PROGRESS NOTES
Patient was referred by Dr. Saranya Perez for audiometric testing, due to possible CI candidacy. She reported unilateral hearing loss. Patient  denied any history of hearing aid use. Previous audiometric testing revealed a a profound rising to mild sensorineural hearing loss, in the right ear and a moderate rising to mild sensorineural hearing loss, in the left ear. Clinic owned [address] [place] [address],[address], [facility] hearing aids were programmed to using the NAL-NL2 fitting rationale. Hearing aids were verified and found to be appropriate for patient's hearing loss. Aided testing was completed at 60 . Results    RIGHT    CNC Words 0%   AzBio Quiet 39%     LEFT    CNC Words 80%   AzBio +5 SNR 67%     BILATERAL    CNC Words 80%   AzBio +5 SNR 63%       The results were reviewed with the patient and Dr. Saranya Perez. Patient will be scheduled for a device selection appointment. Sven Ramirez/CCC-A  [place] [facility] [id number]  Electronically signed by Sven Ramirez on 10/12/2023 at 4:34 PM      Note: At least 35 minutes spent face to face with patient collecting case history, performing tests, and reviewing results.

## 2023-10-17 ENCOUNTER — TELEPHONE (OUTPATIENT)
Dept: ENT CLINIC | Age: 58
End: 2023-10-17

## 2023-10-18 ENCOUNTER — OFFICE VISIT (OUTPATIENT)
Dept: FAMILY MEDICINE CLINIC | Age: 58
End: 2023-10-18
Payer: COMMERCIAL

## 2023-10-18 VITALS
BODY MASS INDEX: 28.16 KG/M2 | TEMPERATURE: 96.4 F | HEIGHT: 62 IN | RESPIRATION RATE: 20 BRPM | DIASTOLIC BLOOD PRESSURE: 67 MMHG | SYSTOLIC BLOOD PRESSURE: 112 MMHG | WEIGHT: 153 LBS

## 2023-10-18 DIAGNOSIS — N95.1 MENOPAUSAL SYMPTOM: ICD-10-CM

## 2023-10-18 DIAGNOSIS — R68.89 FLU-LIKE SYMPTOMS: ICD-10-CM

## 2023-10-18 DIAGNOSIS — R53.83 FATIGUE, UNSPECIFIED TYPE: Primary | ICD-10-CM

## 2023-10-18 DIAGNOSIS — Z92.29 HISTORY OF POSTMENOPAUSAL HORMONE REPLACEMENT THERAPY: ICD-10-CM

## 2023-10-18 LAB
INFLUENZA A ANTIGEN, POC: NEGATIVE
INFLUENZA B ANTIGEN, POC: NEGATIVE
LOT EXPIRE DATE: NORMAL
LOT KIT NUMBER: NORMAL
RSV ANTIGEN: NEGATIVE
SARS-COV-2, POC: NORMAL
VALID INTERNAL CONTROL: POSITIVE
VENDOR AND KIT NAME POC: NORMAL

## 2023-10-18 PROCEDURE — 86756 RESPIRATORY VIRUS ANTIBODY: CPT | Performed by: STUDENT IN AN ORGANIZED HEALTH CARE EDUCATION/TRAINING PROGRAM

## 2023-10-18 PROCEDURE — 87428 SARSCOV & INF VIR A&B AG IA: CPT | Performed by: STUDENT IN AN ORGANIZED HEALTH CARE EDUCATION/TRAINING PROGRAM

## 2023-10-18 PROCEDURE — 99213 OFFICE O/P EST LOW 20 MIN: CPT | Performed by: STUDENT IN AN ORGANIZED HEALTH CARE EDUCATION/TRAINING PROGRAM

## 2023-10-18 ASSESSMENT — ENCOUNTER SYMPTOMS
WHEEZING: 0
COUGH: 0
SHORTNESS OF BREATH: 0

## 2023-10-18 NOTE — PROGRESS NOTES
Facundo Rogers (:  1965) is a 62 y.o. female,Established patient, here for evaluation of the following:  Dizziness (Pt is complaining for symptoms of menopause. Pt did receive flu shot last Wednesday and states she hasnt felt right since )         ASSESSMENT/PLAN    Patient's vital signs are stable and in no acute distress, appropriate for outpatient treatment    Patient was negative for COVID, flu AMB, and RSV, possibly her fatigue with hot and cold flashes, mild body aches, and feeling generally unwell secondary to getting flu shot last week and/or a combination of menopausal symptoms and starting hormone replacement therapy, she is only drinking about 20 ounces of water, she will increase water intake, stay home from work for the next few days to allow time for rest and adjustment, return if she does not feel better or proceed immediately to the nearest emergency department if symptoms worsen    1. Fatigue, unspecified type  2. Flu-like symptoms  -     POCT COVID-19 & Influenza A/B  -     POCT RSV  3. History of postmenopausal hormone replacement therapy  4. Menopausal symptom    No follow-ups on file. Results for POC orders placed in visit on 10/18/23   POCT RSV   Result Value Ref Range    RSV Antigen negative          Subjective   SUBJECTIVE/OBJECTIVE:  Pt has been feeling unwell for a week. Pt is unsure if this is caused from the flu shot or menopause   She is going through menopause with regular hot flashes and now starting HRT currently has fatigue  feels dizzy with light headedness, night sweats, freezing, fatigue, body aches. Pt becomes dizzy when she stands up, no room spinning or feeling of passing out. Feels disequilibrium    Pt is drinking around 20oz, recommended to drink more water. recent labs reviewed including CMP which was normal, TSH was was normal CBC without leukocytosis or anemia    Allergies:   Patient has no known allergies.    Past Medical History:   Diagnosis Date

## 2023-10-19 ENCOUNTER — HOSPITAL ENCOUNTER (OUTPATIENT)
Dept: SPEECH THERAPY | Age: 58
Setting detail: THERAPIES SERIES
Discharge: HOME | End: 2023-10-19
Payer: COMMERCIAL

## 2023-10-19 PROCEDURE — 92523 SPEECH SOUND LANG COMPREHEN: CPT

## 2023-10-19 NOTE — PROGRESS NOTES
[facility]  SPEECH/LANGUAGE PATHOLOGY  SPEECH/LANGUAGE/COGNITIVE EVALUATION   and PLAN OF CARE      PATIENT NAME:  Juan Loving  (female)     MRN:  [id number]    :  1965  (62 y.o.)  STATUS:  Outpatient clinic   TODAY'S DATE:  10/19/2023  REFERRING PROVIDER:   Hayden Bran MD  SPECIFIC PROVIDER ORDER: SLP eval and treat  Date of order:  10/17/2023  EVALUATING THERAPIST: CHRISTINA Pate    CERTIFICATION/RECERTIFICATION PERIOD: 10/19/2023 to 24  INSURANCE PROVIDER:  Payor: Bev Armendariz / Plan: Paula Maldonado PPO / Product Type: *No Product type* /    INSURANCE ID:  [id number] - ([facility])   [facility] (if applicable):        CPT Codes  EVALUATION: 84820 Evaluation of Speech Sound Language Comprehension     60 Minutes     TREATMENT:  Requesting treatment authorization for  30 visits over 30 weeks focusing on the following CPT codes:      {SpeechCPTCodes:23993}    REFERRING/TREATMENT DIAGNOSIS: Bilateral sensorineural hearing loss [H90.3]  Sudden left hearing loss [H91.22]  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 SHORT/LONG TERM GOALS     Detect all Ling-6 sounds at 3 ft., 6 ft., and 9 ft.      2. Discriminate between two words that differ in syllable length achieving greater than 90% accuracy. 3. Identify the correct word given a set of 5-10 familiar words achieving greater than 90% accuracy. 4. Answer questions or follow directions presented auditorily regarding a known topic achieving greater than 90% accuracy.       5.  Identify the correct monosyllabic word given a set of 4-6

## 2023-10-27 ENCOUNTER — NURSE ONLY (OUTPATIENT)
Dept: FAMILY MEDICINE CLINIC | Age: 58
End: 2023-10-27
Payer: COMMERCIAL

## 2023-10-27 DIAGNOSIS — Z23 NEED FOR PNEUMOCOCCAL VACCINATION: Primary | ICD-10-CM

## 2023-10-27 PROCEDURE — 90471 IMMUNIZATION ADMIN: CPT | Performed by: STUDENT IN AN ORGANIZED HEALTH CARE EDUCATION/TRAINING PROGRAM

## 2023-10-27 PROCEDURE — 90677 PCV20 VACCINE IM: CPT | Performed by: STUDENT IN AN ORGANIZED HEALTH CARE EDUCATION/TRAINING PROGRAM

## 2023-10-27 PROCEDURE — 99999 PR OFFICE/OUTPT VISIT,PROCEDURE ONLY: CPT | Performed by: STUDENT IN AN ORGANIZED HEALTH CARE EDUCATION/TRAINING PROGRAM

## 2023-10-31 ENCOUNTER — FOLLOWUP TELEPHONE ENCOUNTER (OUTPATIENT)
Dept: AUDIOLOGY | Age: 58
End: 2023-10-31

## 2023-10-31 NOTE — TELEPHONE ENCOUNTER
Called patient in regards to cochlear implant  materials. Patient stated that she hasn't been fully able to look through them, but will look at them later and call to set up an appointment if she has any questions regarding each .     Electronically signed by Sven Whitman on 10/31/2023 at 9:10 AM

## 2024-01-01 ENCOUNTER — PATIENT MESSAGE (OUTPATIENT)
Dept: FAMILY MEDICINE CLINIC | Age: 59
End: 2024-01-01

## 2024-01-01 DIAGNOSIS — G72.0 STATIN MYOPATHY: ICD-10-CM

## 2024-01-01 DIAGNOSIS — T46.6X5A STATIN MYOPATHY: ICD-10-CM

## 2024-01-01 DIAGNOSIS — E78.01 FAMILIAL HYPERCHOLESTEROLEMIA: ICD-10-CM

## 2024-01-02 NOTE — TELEPHONE ENCOUNTER
From: Fara Phipps  To: Dr. Jazzmine Doan  Sent: 1/1/2024 3:08 PM EST  Subject: Repatha    Can you when you have time send refills of Repatha to [facility] in [place]. They currently are at a mail in pharmacy.   Thank you

## 2024-01-08 ENCOUNTER — TELEPHONE (OUTPATIENT)
Dept: AUDIOLOGY | Age: 59
End: 2024-01-08

## 2024-01-08 NOTE — TELEPHONE ENCOUNTER
Called and LVM to schedule CI device selection appointment.    Electronically signed by Sven Salgado on 1/8/2024 at 1:57 PM

## 2024-01-12 ENCOUNTER — PROCEDURE VISIT (OUTPATIENT)
Dept: AUDIOLOGY | Age: 59
End: 2024-01-12

## 2024-01-12 DIAGNOSIS — H90.41 SENSORINEURAL HEARING LOSS (SNHL) OF RIGHT EAR WITH UNRESTRICTED HEARING OF LEFT EAR: Primary | ICD-10-CM

## 2024-01-12 PROCEDURE — 99024 POSTOP FOLLOW-UP VISIT: CPT

## 2024-01-12 NOTE — PROGRESS NOTES
Patient came in for discussion about Cochlear Implants and the  she would like to choose. After research of her own and reading through CI materials, patient chose Cochlear, color black, AquaKit for P[employer] 2, Oatmeal, and TV streamer. Patient had questions about sound quality with one good ear and a cochlear implant and counseled patient with examples of other patient's we have had.     Patient also had questions in regard to insurance for surgery. Told patient I would forward information to Dr. Weaver's staff to call her.    Discussed speech therapy and appointments within the first 6 months, patient satisfied with conversation and looking to move forward with process.     Sven Salgado/CCC-A  [place] Lic [id number]  Electronically signed by Sven Salgado on 1/12/2024 at 12:18 PM

## 2024-01-17 ENCOUNTER — PREP FOR PROCEDURE (OUTPATIENT)
Dept: ENT CLINIC | Age: 59
End: 2024-01-17

## 2024-01-17 ENCOUNTER — TELEPHONE (OUTPATIENT)
Dept: ENT CLINIC | Age: 59
End: 2024-01-17

## 2024-01-17 DIAGNOSIS — H90.3 SENSORY HEARING LOSS, BILATERAL: ICD-10-CM

## 2024-01-17 DIAGNOSIS — H91.22 SUDDEN LEFT HEARING LOSS: ICD-10-CM

## 2024-01-17 NOTE — TELEPHONE ENCOUNTER
Prior Authorization Form:      DEMOGRAPHICS:                     Patient Name:  Fara Baxter  Patient :  1965            Insurance:  Payor: BCBS / Plan: BCBS [place] PPO / Product Type: *No Product type* /   Insurance ID Number:    Payer/Plan Subscr  Sex Relation Sub. Ins. ID Effective Group Num   1. BCBS - BCBS -* ELIESER BAXTER* 1963 Male Spouse [id number]                                   [address]         DIAGNOSIS & PROCEDURE:                       Procedure/Operation: RIGHT COCHLEAR IMPLANT WITH FACIAL NERVE MONITORING            CPT Code: 21540,     Diagnosis:  BILATERAL SENSIORNEURAL HEARING LOSS, SUDDEN HEARING LOSS     ICD10 Code: h90.3, h91.22    Location:  [facility]    Surgeon:  LARRY CALHOUN    SCHEDULING INFORMATION:                          Date: 24    Time: NA              Anesthesia:  General                                                       Status:  Outpatient        Special Comments:  PLEASE SUBMIT ALL AUDIO TESTING, ALL DR CALHOUN NOTES, SPEECH EVALUATION, PROOF OF PREVNAR VACCINE        Electronically signed by Veronika Gudino MA on 2024 at 8:12 AM

## 2024-02-16 NOTE — PROGRESS NOTES
[facility] PRE-ADMISSION TESTING INSTRUCTIONS    The Preadmission Testing patient is instructed accordingly using the following criteria (check applicable):    ARRIVAL INSTRUCTIONS:  [x] Parking the day of Surgery is located in the Main Entrance lot.  Upon entering the door, make an immediate right to the surgery reception desk    [x] Bring photo ID and insurance card    [] Bring in a copy of Living will or Durable Power of  papers.    [x] Please be sure to arrange for a responsible adult to provide transportation to and from the hospital    [x] Please arrange for a responsible adult to be with you for the 24 hour period post procedure due to having anesthesia    [x] If you awake am of surgery not feeling well or have temperature >100 please call [phone]    GENERAL INSTRUCTIONS:    [x] May have clear liquids until 2 hours prior to surgery. Examples include water, fruit juices (no pulp), jello, popsicles, black coffee or tea, beef or chicken broth.       May have light meal 6 hours prior to surgery. Example include toast and clear liquids.        No gum, candy or mints.    [x] You may brush your teeth, but do not swallow any water    [] Take medications as instructed     [x] Stop herbal supplements and vitamins 5 days prior to procedure    [] Follow preop dosing of blood thinners per physician instructions    [] Take 1/2 dose of evening insulin, but no insulin after midnight    [] No oral diabetic medications after midnight    [] If diabetic and have low blood sugar or feel symptomatic, take 1-2oz apple juice only    [] Bring inhalers day of surgery    [] Bring urine specimen day of surgery    [x] Shower or bath with soap, lather and rinse well, AM of Surgery, no lotion, powders or creams to surgical site    [] Follow bowel prep as instructed per surgeon    [x] No tobacco products within 24 hours of surgery     [x] No alcohol or illegal drug use, marijuana included, within 24

## 2024-02-20 ENCOUNTER — ANESTHESIA EVENT (OUTPATIENT)
Dept: OPERATING ROOM | Age: 59
End: 2024-02-20
Payer: COMMERCIAL

## 2024-02-21 ENCOUNTER — TELEPHONE (OUTPATIENT)
Dept: ENT CLINIC | Age: 59
End: 2024-02-21

## 2024-02-21 ENCOUNTER — HOSPITAL ENCOUNTER (OUTPATIENT)
Age: 59
Setting detail: OUTPATIENT SURGERY
Discharge: HOME | End: 2024-02-21
Attending: OTOLARYNGOLOGY | Admitting: OTOLARYNGOLOGY
Payer: COMMERCIAL

## 2024-02-21 ENCOUNTER — ANESTHESIA (OUTPATIENT)
Dept: OPERATING ROOM | Age: 59
End: 2024-02-21
Payer: COMMERCIAL

## 2024-02-21 VITALS
BODY MASS INDEX: 27.6 KG/M2 | DIASTOLIC BLOOD PRESSURE: 79 MMHG | SYSTOLIC BLOOD PRESSURE: 152 MMHG | HEART RATE: 98 BPM | WEIGHT: 150 LBS | HEIGHT: 62 IN | RESPIRATION RATE: 18 BRPM | OXYGEN SATURATION: 98 % | TEMPERATURE: 98 F

## 2024-02-21 DIAGNOSIS — G89.18 POST-OP PAIN: Primary | ICD-10-CM

## 2024-02-21 LAB
EKG ATRIAL RATE: 74 BPM
EKG P AXIS: 58 DEGREES
EKG P-R INTERVAL: 168 MS
EKG Q-T INTERVAL: 368 MS
EKG QRS DURATION: 74 MS
EKG QTC CALCULATION (BAZETT): 408 MS
EKG R AXIS: 67 DEGREES
EKG T AXIS: 47 DEGREES
EKG VENTRICULAR RATE: 74 BPM

## 2024-02-21 PROCEDURE — 2709999900 HC NON-CHARGEABLE SUPPLY: Performed by: OTOLARYNGOLOGY

## 2024-02-21 PROCEDURE — 92652 AEP THRSHLD EST MLT FREQ I&R: CPT

## 2024-02-21 PROCEDURE — 7100000000 HC PACU RECOVERY - FIRST 15 MIN: Performed by: OTOLARYNGOLOGY

## 2024-02-21 PROCEDURE — 20922 REMOVAL OF FASCIA FOR GRAFT: CPT | Performed by: OTOLARYNGOLOGY

## 2024-02-21 PROCEDURE — 69799 UNLISTED PX MIDDLE EAR: CPT | Performed by: OTOLARYNGOLOGY

## 2024-02-21 PROCEDURE — 93005 ELECTROCARDIOGRAM TRACING: CPT

## 2024-02-21 PROCEDURE — 6370000000 HC RX 637 (ALT 250 FOR IP): Performed by: OTOLARYNGOLOGY

## 2024-02-21 PROCEDURE — 69930 IMPLANT COCHLEAR DEVICE: CPT | Performed by: OTOLARYNGOLOGY

## 2024-02-21 PROCEDURE — 69631 REPAIR EARDRUM STRUCTURES: CPT | Performed by: OTOLARYNGOLOGY

## 2024-02-21 PROCEDURE — 7100000010 HC PHASE II RECOVERY - FIRST 15 MIN: Performed by: OTOLARYNGOLOGY

## 2024-02-21 PROCEDURE — 2580000003 HC RX 258: Performed by: STUDENT IN AN ORGANIZED HEALTH CARE EDUCATION/TRAINING PROGRAM

## 2024-02-21 PROCEDURE — 3600000004 HC SURGERY LEVEL 4 BASE: Performed by: OTOLARYNGOLOGY

## 2024-02-21 PROCEDURE — 3700000001 HC ADD 15 MINUTES (ANESTHESIA): Performed by: OTOLARYNGOLOGY

## 2024-02-21 PROCEDURE — 6360000002 HC RX W HCPCS: Performed by: OTOLARYNGOLOGY

## 2024-02-21 PROCEDURE — 7100000011 HC PHASE II RECOVERY - ADDTL 15 MIN: Performed by: OTOLARYNGOLOGY

## 2024-02-21 PROCEDURE — 6360000002 HC RX W HCPCS: Performed by: STUDENT IN AN ORGANIZED HEALTH CARE EDUCATION/TRAINING PROGRAM

## 2024-02-21 PROCEDURE — 6360000002 HC RX W HCPCS

## 2024-02-21 PROCEDURE — 3700000000 HC ANESTHESIA ATTENDED CARE: Performed by: OTOLARYNGOLOGY

## 2024-02-21 PROCEDURE — 2500000003 HC RX 250 WO HCPCS: Performed by: OTOLARYNGOLOGY

## 2024-02-21 PROCEDURE — 3600000014 HC SURGERY LEVEL 4 ADDTL 15MIN: Performed by: OTOLARYNGOLOGY

## 2024-02-21 PROCEDURE — 7100000001 HC PACU RECOVERY - ADDTL 15 MIN: Performed by: OTOLARYNGOLOGY

## 2024-02-21 PROCEDURE — 2720000010 HC SURG SUPPLY STERILE: Performed by: OTOLARYNGOLOGY

## 2024-02-21 PROCEDURE — L8614 COCHLEAR DEVICE: HCPCS | Performed by: OTOLARYNGOLOGY

## 2024-02-21 DEVICE — IMPLANTABLE DEVICE
Type: IMPLANTABLE DEVICE | Site: EAR | Status: FUNCTIONAL
Brand: COCHLEAR™ NUCLEUS® CI624 COCHLEAR IMPLANT WITH SLIM 20 ELECTRODE

## 2024-02-21 RX ORDER — GLYCOPYRROLATE 0.2 MG/ML
INJECTION INTRAMUSCULAR; INTRAVENOUS PRN
Status: DISCONTINUED | OUTPATIENT
Start: 2024-02-21 | End: 2024-02-21 | Stop reason: SDUPTHER

## 2024-02-21 RX ORDER — SODIUM CHLORIDE 0.9 % (FLUSH) 0.9 %
5-40 SYRINGE (ML) INJECTION PRN
Status: DISCONTINUED | OUTPATIENT
Start: 2024-02-21 | End: 2024-02-21 | Stop reason: HOSPADM

## 2024-02-21 RX ORDER — SODIUM CHLORIDE 9 MG/ML
INJECTION, SOLUTION INTRAVENOUS PRN
Status: DISCONTINUED | OUTPATIENT
Start: 2024-02-21 | End: 2024-02-21 | Stop reason: HOSPADM

## 2024-02-21 RX ORDER — PROCHLORPERAZINE EDISYLATE 5 MG/ML
5 INJECTION INTRAMUSCULAR; INTRAVENOUS
Status: DISCONTINUED | OUTPATIENT
Start: 2024-02-21 | End: 2024-02-21 | Stop reason: HOSPADM

## 2024-02-21 RX ORDER — ONDANSETRON 4 MG/1
4 TABLET, ORALLY DISINTEGRATING ORAL 3 TIMES DAILY PRN
Qty: 9 TABLET | Refills: 0 | Status: SHIPPED | OUTPATIENT
Start: 2024-02-21 | End: 2024-02-24

## 2024-02-21 RX ORDER — SODIUM CHLORIDE 0.9 % (FLUSH) 0.9 %
5-40 SYRINGE (ML) INJECTION EVERY 12 HOURS SCHEDULED
Status: DISCONTINUED | OUTPATIENT
Start: 2024-02-21 | End: 2024-02-21 | Stop reason: HOSPADM

## 2024-02-21 RX ORDER — PROPOFOL 10 MG/ML
INJECTION, EMULSION INTRAVENOUS PRN
Status: DISCONTINUED | OUTPATIENT
Start: 2024-02-21 | End: 2024-02-21 | Stop reason: SDUPTHER

## 2024-02-21 RX ORDER — TRAMADOL HYDROCHLORIDE 50 MG/1
50 TABLET ORAL EVERY 6 HOURS PRN
Qty: 12 TABLET | Refills: 0 | Status: SHIPPED | OUTPATIENT
Start: 2024-02-21 | End: 2024-02-24

## 2024-02-21 RX ORDER — EPINEPHRINE 1 MG/ML
INJECTION, SOLUTION, CONCENTRATE INTRAVENOUS PRN
Status: DISCONTINUED | OUTPATIENT
Start: 2024-02-21 | End: 2024-02-21 | Stop reason: ALTCHOICE

## 2024-02-21 RX ORDER — LIDOCAINE HYDROCHLORIDE AND EPINEPHRINE 10; 10 MG/ML; UG/ML
INJECTION, SOLUTION INFILTRATION; PERINEURAL PRN
Status: DISCONTINUED | OUTPATIENT
Start: 2024-02-21 | End: 2024-02-21 | Stop reason: ALTCHOICE

## 2024-02-21 RX ORDER — ONDANSETRON 2 MG/ML
INJECTION INTRAMUSCULAR; INTRAVENOUS PRN
Status: DISCONTINUED | OUTPATIENT
Start: 2024-02-21 | End: 2024-02-21 | Stop reason: SDUPTHER

## 2024-02-21 RX ORDER — PHENYLEPHRINE HCL IN 0.9% NACL 1 MG/10 ML
SYRINGE (ML) INTRAVENOUS PRN
Status: DISCONTINUED | OUTPATIENT
Start: 2024-02-21 | End: 2024-02-21 | Stop reason: SDUPTHER

## 2024-02-21 RX ORDER — FENTANYL CITRATE 50 UG/ML
50 INJECTION, SOLUTION INTRAMUSCULAR; INTRAVENOUS EVERY 5 MIN PRN
Status: DISCONTINUED | OUTPATIENT
Start: 2024-02-21 | End: 2024-02-21 | Stop reason: HOSPADM

## 2024-02-21 RX ORDER — OXYCODONE HYDROCHLORIDE 5 MG/1
5 TABLET ORAL
Status: DISCONTINUED | OUTPATIENT
Start: 2024-02-21 | End: 2024-02-21 | Stop reason: HOSPADM

## 2024-02-21 RX ORDER — FENTANYL CITRATE 50 UG/ML
INJECTION, SOLUTION INTRAMUSCULAR; INTRAVENOUS PRN
Status: DISCONTINUED | OUTPATIENT
Start: 2024-02-21 | End: 2024-02-21 | Stop reason: SDUPTHER

## 2024-02-21 RX ORDER — DEXAMETHASONE SODIUM PHOSPHATE 4 MG/ML
INJECTION, SOLUTION INTRA-ARTICULAR; INTRALESIONAL; INTRAMUSCULAR; INTRAVENOUS; SOFT TISSUE PRN
Status: DISCONTINUED | OUTPATIENT
Start: 2024-02-21 | End: 2024-02-21 | Stop reason: SDUPTHER

## 2024-02-21 RX ORDER — AZITHROMYCIN 250 MG/1
TABLET, FILM COATED ORAL
Qty: 6 TABLET | Refills: 0 | Status: SHIPPED | OUTPATIENT
Start: 2024-02-21 | End: 2024-03-02

## 2024-02-21 RX ORDER — SODIUM CHLORIDE, SODIUM LACTATE, POTASSIUM CHLORIDE, CALCIUM CHLORIDE 600; 310; 30; 20 MG/100ML; MG/100ML; MG/100ML; MG/100ML
INJECTION, SOLUTION INTRAVENOUS CONTINUOUS
Status: DISCONTINUED | OUTPATIENT
Start: 2024-02-21 | End: 2024-02-21 | Stop reason: HOSPADM

## 2024-02-21 RX ORDER — MIDAZOLAM HYDROCHLORIDE 1 MG/ML
INJECTION INTRAMUSCULAR; INTRAVENOUS PRN
Status: DISCONTINUED | OUTPATIENT
Start: 2024-02-21 | End: 2024-02-21 | Stop reason: SDUPTHER

## 2024-02-21 RX ORDER — BACITRACIN ZINC 500 [USP'U]/G
OINTMENT TOPICAL PRN
Status: DISCONTINUED | OUTPATIENT
Start: 2024-02-21 | End: 2024-02-21 | Stop reason: ALTCHOICE

## 2024-02-21 RX ADMIN — DEXAMETHASONE SODIUM PHOSPHATE 10 MG: 4 INJECTION, SOLUTION INTRAMUSCULAR; INTRAVENOUS at 12:29

## 2024-02-21 RX ADMIN — SODIUM CHLORIDE: 9 INJECTION, SOLUTION INTRAVENOUS at 12:19

## 2024-02-21 RX ADMIN — ONDANSETRON 4 MG: 2 INJECTION INTRAMUSCULAR; INTRAVENOUS at 12:29

## 2024-02-21 RX ADMIN — HYDROMORPHONE HYDROCHLORIDE 0.5 MG: 1 INJECTION, SOLUTION INTRAMUSCULAR; INTRAVENOUS; SUBCUTANEOUS at 16:45

## 2024-02-21 RX ADMIN — SODIUM CHLORIDE: 9 INJECTION, SOLUTION INTRAVENOUS at 15:31

## 2024-02-21 RX ADMIN — FENTANYL CITRATE 100 MCG: 50 INJECTION, SOLUTION INTRAMUSCULAR; INTRAVENOUS at 12:21

## 2024-02-21 RX ADMIN — WATER 2000 MG: 1 INJECTION INTRAMUSCULAR; INTRAVENOUS; SUBCUTANEOUS at 12:29

## 2024-02-21 RX ADMIN — PROPOFOL 100 MG: 10 INJECTION, EMULSION INTRAVENOUS at 12:27

## 2024-02-21 RX ADMIN — Medication 100 MCG: at 13:54

## 2024-02-21 RX ADMIN — GLYCOPYRROLATE 0.3 MG: 0.2 INJECTION INTRAMUSCULAR; INTRAVENOUS at 12:35

## 2024-02-21 RX ADMIN — Medication 100 MCG: at 12:45

## 2024-02-21 RX ADMIN — PROPOFOL 200 MG: 10 INJECTION, EMULSION INTRAVENOUS at 12:25

## 2024-02-21 RX ADMIN — FENTANYL CITRATE 50 MCG: 50 INJECTION, SOLUTION INTRAMUSCULAR; INTRAVENOUS at 14:42

## 2024-02-21 RX ADMIN — Medication 50 MCG: at 14:18

## 2024-02-21 RX ADMIN — Medication 100 MCG: at 12:33

## 2024-02-21 RX ADMIN — SODIUM CHLORIDE: 9 INJECTION, SOLUTION INTRAVENOUS at 12:58

## 2024-02-21 RX ADMIN — FENTANYL CITRATE 50 MCG: 50 INJECTION, SOLUTION INTRAMUSCULAR; INTRAVENOUS at 13:43

## 2024-02-21 RX ADMIN — MIDAZOLAM 2 MG: 1 INJECTION INTRAMUSCULAR; INTRAVENOUS at 12:21

## 2024-02-21 ASSESSMENT — LIFESTYLE VARIABLES: SMOKING_STATUS: 0

## 2024-02-21 ASSESSMENT — PAIN SCALES - GENERAL
PAINLEVEL_OUTOF10: 0

## 2024-02-21 ASSESSMENT — PAIN - FUNCTIONAL ASSESSMENT: PAIN_FUNCTIONAL_ASSESSMENT: 0-10

## 2024-02-21 NOTE — H&P
history as noted in the electronic medical record.     Exam: /75 (Site: Right Upper Arm, Position: Sitting, Cuff Size: Medium Adult)   Pulse 73   Ht 5' 2\" (1.575 m)   Wt 153 lb (69.4 kg)   LMP 03/20/2019 (Approximate)   SpO2 98%   BMI 27.98 kg/m²   Fara Phipps is a 58 y.o. female  appears well, in no apparent distress.  Alert and oriented times three, pleasant and cooperative. Vital signs are as documented in vital signs section.  Breathing comfortably, without stertor or stridor  Ear exam: External ears normal. Canals clear. TM's normal.  No nasal lesions, no erythema  No oral lesions.   There is no palpable adenopathy or tenderness           Lab Results   Component Value Date/Time     WBC 3.7 (L) 04/20/2023 08:15 AM     HGB 12.8 04/20/2023 08:15 AM     HCT 39.9 04/20/2023 08:15 AM      04/20/2023 08:15 AM     MCV 95.7 04/20/2023 08:15 AM     MCH 30.7 04/20/2023 08:15 AM     MCHC 32.1 04/20/2023 08:15 AM     RDW 13.2 04/20/2023 08:15 AM     NEUTOPHILPCT 63.6 04/20/2023 08:15 AM     LYMPHOPCT 24.1 04/20/2023 08:15 AM     MONOPCT 10.1 04/20/2023 08:15 AM     EOSRELPCT 1.6 04/20/2023 08:15 AM     BASOPCT 0.3 04/20/2023 08:15 AM     NEUTROABS 2.32 04/20/2023 08:15 AM     LYMPHSABS 0.88 (L) 04/20/2023 08:15 AM     EOSABS 0.06 04/20/2023 08:15 AM         On this date 10/12/2023 I have spent 10 minutes reviewing previous notes, test results and 30 min face to face with the patient discussing the diagnosis and importance of compliance with the treatment plan as well as documenting on the day of the visit.     A/P:       Fara Phipps is a 58 y.o. female with Bilateral sensorineural hearing loss (right sudden 1 year ago and left recently) confirmed by Audiogram, who will benefit from Medical therapy with oral and transtympanic steroids . The rest of the exam was unremarkable  Speech evaluation  CI evaluation: done, is a candidate in her right ear  Vaccination pending  Speech evaluation  pending  Ci risks and benefits reviewed  Consent and registry forms signed  We will obtain old records

## 2024-02-21 NOTE — DISCHARGE INSTRUCTIONS
POSTOP EAR SURGERY INSTRUCTIONS  Regular diet  Remove dressing on 2/22/24  No strenuous activity for 2 weeks  Alternate OTC tylenol with ibuprofen for pain control  Prescription if provided for breakthrough pain control   She can return to work in 3-5 days  She can gently wash Her hair but strict dry ear precautions for the inside of the ear  (Can use ear plugs or cotton ball with vaseline to keep moisture out of the ear)  Topical antibiotic ointment to any visible incision 2X/day for 2 weeks  Keep follow up appointment

## 2024-02-21 NOTE — PROGRESS NOTES
Patient was here for cochlear implantation on the right, with  array (SN:[id number]). Four extracochlear electrodes. Impedance values were within normal limits across the electrode array, except elecrodes 21, 15, 13, and 1. NRT was completed and responses were present on electrodes 22, 20, 18. No responses on electrode 21, 19, 17-1. Results discussed with Dr. Weaver.    Start time of case: 12:21 PM, end time: 4:11 PM. Ran impedances 4 times in between insertions, as well as measuring NRT 3 times between insertions.      The initial activation is scheduled for 3/7.    Sven Salgado/CCC-A  [place] Lic [id number]  Electronically signed by Sven Salgado on 2/21/2024 at 9:11 AM

## 2024-02-21 NOTE — ANESTHESIA POSTPROCEDURE EVALUATION
Department of Anesthesiology  Postprocedure Note    Patient: Fara Phipps  MRN: [id number]  YOB: 1965  Date of evaluation: 2/21/2024    Procedure Summary       Date: 02/21/24 Room / Location: [address]    Anesthesia Start: 1219 Anesthesia Stop: 1625    Procedure: RIGHT COCHLEAR IMPLANT WITH FACIAL NERVE MONITORING (Right: Ear) Diagnosis:       Sensory hearing loss, bilateral      Sudden left hearing loss      (Sensory hearing loss, bilateral [H90.3])      (Sudden left hearing loss [H91.22])    Surgeons: Kemi Weaver MD Responsible Provider: Nydia Saldivar DO    Anesthesia Type: general ASA Status: 2            Anesthesia Type: No value filed.    Jaquelin Phase I: Jaquelin Score: 10    Jaquelin Phase II:      Anesthesia Post Evaluation    Patient location during evaluation: PACU  Patient participation: complete - patient participated  Level of consciousness: awake  Pain score: 2  Airway patency: patent  Nausea & Vomiting: no nausea and no vomiting  Cardiovascular status: blood pressure returned to baseline and hemodynamically stable  Respiratory status: acceptable  Hydration status: euvolemic  Pain management: adequate        No notable events documented.

## 2024-02-21 NOTE — ANESTHESIA PRE PROCEDURE
Department of Anesthesiology  Preprocedure Note       Name:  Fara Phipps   Age:  58 y.o.  :  1965                                          MRN:  [id number]         Date:  2024      Surgeon: Surgeon(s):  Kemi Weaver MD    Procedure: Procedure(s):  RIGHT COCHLEAR IMPLANT WITH FACIAL NERVE MONITORING   ++COCHLEAR OF ANGELINA++      ++STAFF FROM ROOM 6++    Medications prior to admission:   Prior to Admission medications    Medication Sig Start Date End Date Taking? Authorizing Provider   escitalopram (LEXAPRO) 10 MG tablet Take 1 tablet by mouth daily  Patient taking differently: Take 1 tablet by mouth daily Takes at night 24   Ashwin Byrne MD   progesterone (PROMETRIUM) 200 MG CAPS capsule TAKE 1 CAPSULE BY MOUTH EVERY NIGHT 2/3/24   Breonna Vasquez APRN - CNM   Estradiol (DIVIGEL) 0.25 MG/0.25GM GEL Place 1 Package onto the skin daily 24   Ashwin Byrne MD   Evolocumab (REPATHA) SOSY syringe Inject 1 mL into the skin every 14 days 24   Jazzmine Doan MD   ondansetron (ZOFRAN-ODT) 4 MG disintegrating tablet DISSOLVE 1 TABLET BY MOUTH EVERY 6 TO 8 HOURS AS NEEDED FOR NAUSEA OR VOMITING  Patient not taking: Reported on 2024   Trish Arriaza MD   fluticasone (FLONASE) 50 MCG/ACT nasal spray 2 sprays by Each Nostril route daily  Patient not taking: Reported on 2024 10/7/22   Ab Cole APRN - CNP   azelastine (ASTELIN) 0.1 % nasal spray USE 1 TO 2 SPRAYS IN EACH NOSTRIL TWICE DAILY AS NEEDED AS DIRECTED FOR RHINITIS  Patient not taking: Reported on 2024 10/7/22   Ab Cole APRN - CNP   b complex vitamins capsule Take 1 capsule by mouth daily    Trish Arriaza MD   vitamin D (CHOLECALCIFEROL) 1000 UNIT TABS tablet Take 1 tablet by mouth daily    Trish Arriaza MD       Current medications:    No current facility-administered medications for this encounter.       Allergies:  No Known Allergies    Problem List:    Patient

## 2024-02-21 NOTE — OP NOTE
Patient ID:  Date of Procedure: 2/21/2024  Patient name: Fara Phipps  YOB: 1965  Medical record number: [id number]    Surgeon: Dr. Waever    Assistant: Dr. EMMETT Zamudio    Pre op: Right profound SNHL    Post op: same    Procedure:  right cochlear implant with microscopic and endoscopic assistance, combination tympanoplasty approach, free tissue graft, facial nerve monitoring, intraoperative neural response testing    HPI: Fara Phipps is a 58 y.o. female with right sudden hearing loss secondary to COVID 2 years ago    I discussed the risks and benefits of the cochlear implantation which includes persistent hearing loss, failure to improve communication, facial nerve weakness or injury, CSF leak, infection, ear numbness, dizziness, ringing of the ears, device failure, poor hearing.   I emphasized the need for regular appointments after implanantation surgery to make sure that programming of the device was optimal, for optimal benefit from the device.    Procedure: Patient was brought to the OR and induced under general anesthesia, without nitrous or paralysis with an LMA. The right site was verified, and marked with the templates. The scans were loaded in the operating room for easy visualization. The facial nerve monitor was placed and tested and found to be working appropriately. The ear was cleaned and examined under the microscope to verify that it was healthy.    The table was turned 180 degrees. The ear was prepped and drapped in a sterile fashion. The postauricular area was injected with 10ml of 1% lidocaine with epinephrine. A postauricular incision was made, and the ear was elevated anteriorly. A T-shaped periosteal incision was made and the ear was retracted anteriorly. A tight periosteal pocket was created to tightly fit the device    A small temporalis facial graft was harvested and preserved in saline.    A simple cortical mastoidectomy was performed. The landmarks were

## 2024-02-21 NOTE — TELEPHONE ENCOUNTER
Nurse Reaves from [facility] called and medical director overturned case for 2/21/24 for right cochlear implant approved with auth# [id number] ok to proceed with surgery Jelly will fax approval paperwork with in 30 minutes.

## 2024-02-21 NOTE — H&P
Fara Phipps was seen and re-examined preoperatively today, February 21, 2024.  There was no substantial change in her physical and medical status. All Meds and Family/Social/Previous history was reviewed and there were no significant changes. Patient is fit for the proposed surgical procedure.  All questions were appropriately addressed and had no further questions regarding the risks, benefits, and alternatives of the procedure.  Fara Phipps and family wished to proceed.    Ash Zamudio DO  Resident Physician  [facility]  Otolaryngology Residency  2/21/2024  7:43 AM

## 2024-02-21 NOTE — TELEPHONE ENCOUNTER
Called [facility] in regards to denial for prior authorization for cochlear implant right. Originally left a detailed voicemail for nurse line called and spoke with supervisor along with the medical director will re-review case for Right CI and call back with in 30 minutes with status of case.

## 2024-02-22 NOTE — PROGRESS NOTES
CLINICAL PHARMACY NOTE: MEDS TO BEDS    Total # of Prescriptions Filled: 3   The following medications were delivered to the patient:  Ondansetron 4 mg ODT   Azithromycin 250 mg   Tramadol 50 mg      Additional Documentation:

## 2024-03-05 NOTE — PROGRESS NOTES
activity  Follow up in o    Kemi Weaver MD MS FACS FAAP  03/07/24 10:49 AM   Director Otology and Cochlear Implant Programs

## 2024-03-07 ENCOUNTER — PROCEDURE VISIT (OUTPATIENT)
Dept: AUDIOLOGY | Age: 59
End: 2024-03-07

## 2024-03-07 ENCOUNTER — OFFICE VISIT (OUTPATIENT)
Dept: ENT CLINIC | Age: 59
End: 2024-03-07

## 2024-03-07 VITALS — BODY MASS INDEX: 27.6 KG/M2 | HEIGHT: 62 IN | WEIGHT: 150 LBS

## 2024-03-07 DIAGNOSIS — H90.3 SENSORY HEARING LOSS, BILATERAL: Primary | ICD-10-CM

## 2024-03-07 DIAGNOSIS — Z96.21 HISTORY OF COCHLEAR IMPLANT: ICD-10-CM

## 2024-03-07 DIAGNOSIS — H91.22 SUDDEN LEFT HEARING LOSS: ICD-10-CM

## 2024-03-07 DIAGNOSIS — H90.41 SENSORINEURAL HEARING LOSS (SNHL) OF RIGHT EAR WITH UNRESTRICTED HEARING OF LEFT EAR: Primary | ICD-10-CM

## 2024-03-07 PROCEDURE — 99024 POSTOP FOLLOW-UP VISIT: CPT | Performed by: OTOLARYNGOLOGY

## 2024-03-07 RX ORDER — OFLOXACIN 3 MG/ML
5 SOLUTION AURICULAR (OTIC) 2 TIMES DAILY
Qty: 5 ML | Refills: 0 | Status: SHIPPED | OUTPATIENT
Start: 2024-03-07 | End: 2024-03-17

## 2024-03-07 NOTE — PROGRESS NOTES
Cochlear Audiology Representative present for appointment.    Patient was seen for CI activation, after medical clearance. SN: [id number], magnet strength: 4(I). Impedances were good on electrodes 1-12, 14, 16-20, and 22. High impedances on electrodes 13, 15, and 21.    Created MAP based on population based means. Increased each electrode until patient heard any sounds. For electrodes 22, 20-17, patient reported beeping and ringing. No loudness growth on electrodes 16-10. Electrodes 1-9, 13, 15, and 21 turned off. Widened pulse width to 50 for all electrodes. Shifted T levels to 35 on all turned on electrodes. Increased overall gain until patient was comfortable with audibility. Patient was satisfied with loudness and comfort.    Note: on electrodes 16, 14, and 12-5, once we reached a certain limit patient reported a funny feeling inside her head that made her feel lightheaded.     Reviewed battery usage and charging. Practiced putting processor on/taking processor off. Counseled on watching magnet site for any redness.     Battery estimation was 13 hours.    Processor connected to patient's phone at this appointment. Counseled patient on streaming podcasts, TV shows, or news. Gave patient ear plugs to practice with at home.    Gave patient Cochlear handouts, expectations sheet, and aural rehab information.     Patient will follow up 3/22 at 8 am. Representative will be present for appointment.    Sven Salgado/CCC-LINO  [place] Lic [id number]  Electronically signed by Sven Salgado on 3/7/2024 at 3:08 PM

## 2024-03-08 ENCOUNTER — TELEPHONE (OUTPATIENT)
Dept: AUDIOLOGY | Age: 59
End: 2024-03-08

## 2024-03-08 NOTE — TELEPHONE ENCOUNTER
Patient called to clarify some questions about sound processor and how to properly practice with sound processor. Counseled patient on wearing processor all waking hours, at least one hour just practicing with the good ear plugged or streaming through her phone.     Electronically signed by Sven Salgado on 3/8/2024 at 11:57 AM

## 2024-03-14 ENCOUNTER — TELEPHONE (OUTPATIENT)
Dept: ENT CLINIC | Age: 59
End: 2024-03-14

## 2024-03-15 ENCOUNTER — TELEPHONE (OUTPATIENT)
Dept: ENT CLINIC | Age: 59
End: 2024-03-15

## 2024-03-18 ENCOUNTER — HOSPITAL ENCOUNTER (OUTPATIENT)
Dept: SPEECH THERAPY | Age: 59
Setting detail: THERAPIES SERIES
Discharge: HOME | End: 2024-03-18
Payer: COMMERCIAL

## 2024-03-18 PROCEDURE — 92507 TX SP LANG VOICE COMM INDIV: CPT | Performed by: SPEECH-LANGUAGE PATHOLOGIST

## 2024-03-18 NOTE — PROGRESS NOTES
SPEECH LANGUAGE PATHOLOGY  DAILY PROGRESS NOTE        PATIENT NAME:  Fara Phipps      :  1965          TODAY'S DATE:  3/18/2024     POC:    Detect all Ling-6 sounds at 3 ft., 6 ft., and 9 ft.      2. Discriminate between two words that differ in syllable length achieving greater than 90% accuracy.      3. Identify the correct word given a set of 5-10 familiar words achieving greater than 90% accuracy.      4. Answer questions or follow directions presented auditorily regarding a known topic achieving greater than 90% accuracy.      5.  Identify the correct monosyllabic word given a set of 4-6 familiar words achieving greater than 90% accuracy.      6. Discriminate between words in which the initial consonant differ only in voicing (minimal pairs) achieving greater than 90% accuracy.      7. Follow instructions presented auditorily only containing two critical elements achieving greater than 90% accuracy.      8. Identify the correct phrase or sentence from a set of 4-8 achieving greater than 90% accuracy.      9. Read aloud directions/sentences changing one word- have patient determine the word that was changed in a common phrase achieving greater than 90% accuracy.      10.  Minimal pairs in sentences- have the patient discriminate between the two words achieving greater than 90% accuracy.      11.  Correctly imitate a 3-5 word phrase presented auditorily achieving greater than 90% accuracy.     12. Identify the topic of a short paragraph read aloud achieving greater than 90% accuracy.       Subjective:    Pt was seen this date for 30 minute speech perception therapy following activation of left cochlear implant on 3/7/2024. Patient reported that she is hearing beeping noises when sound is occurring. Patient has been completing homework by plugging her right ear and listening to closed captions or reading aloud- because of patient's strong hearing in right ear, the clinician recommended that as much

## 2024-03-22 ENCOUNTER — PROCEDURE VISIT (OUTPATIENT)
Dept: AUDIOLOGY | Age: 59
End: 2024-03-22

## 2024-03-22 DIAGNOSIS — H90.41 SENSORINEURAL HEARING LOSS (SNHL) OF RIGHT EAR WITH UNRESTRICTED HEARING OF LEFT EAR: Primary | ICD-10-CM

## 2024-03-22 NOTE — PROGRESS NOTES
Cochlear Audiology representative present for appointment.     Patient was here for CI follow up, after activation 03/7. Impedances were good, open electrodes (21,15, and 13), which is the same as previous testing. Magnet site looked good, changed to strength 3(I).     Patient reported wearing processor all waking hours. Datalogging confirmed 11.7 hours. Patient denied any dizziness, nausea, pain, or tingling in face.    Patient reported beeping sounds and that she has been practicing with streaming through her processor with her phone. Patient watched TV with the captions on and plugged up her good ear. Patient reported that she has been practicing with homework speech therapy has given her, as well. Patient reported taste on the right side of the tongue has been off with a metallic taste.     Increased T&C levels for electrodes that were previously on at last appointment (22, 20, 19, 18, 17, 16). Turned on electrodes 6-9, ran compliance levels and made sure patient was not getting any \"dizzy\" feelings. Made sure each electrode that was turned on was at a loud but comfortable level for the patient. Patient reported sound was better than when patient came in. Patient was satisfied with loudness and comfort.    Reviewed battery usage and charging.     Battery estimation was 10 hours.     Patient will follow up 4/12 with cochlear representative present.     Sven Salgado/CCC-A  [place] Lic [id number]  Electronically signed by Sven Salgado on 3/22/2024 at 2:02 PM

## 2024-03-25 ENCOUNTER — HOSPITAL ENCOUNTER (OUTPATIENT)
Dept: SPEECH THERAPY | Age: 59
Setting detail: THERAPIES SERIES
End: 2024-03-25
Payer: COMMERCIAL

## 2024-03-26 ENCOUNTER — HOSPITAL ENCOUNTER (OUTPATIENT)
Dept: SPEECH THERAPY | Age: 59
Setting detail: THERAPIES SERIES
Discharge: HOME | End: 2024-03-26
Payer: COMMERCIAL

## 2024-03-26 PROCEDURE — 92507 TX SP LANG VOICE COMM INDIV: CPT | Performed by: SPEECH-LANGUAGE PATHOLOGIST

## 2024-03-26 NOTE — PROGRESS NOTES
SPEECH LANGUAGE PATHOLOGY  DAILY PROGRESS NOTE        PATIENT NAME:  Fara Phipps      :  1965          TODAY'S DATE:  3/26/2024     POC:    Detect all Ling-6 sounds at 3 ft., 6 ft., and 9 ft.      2. Discriminate between two words that differ in syllable length achieving greater than 90% accuracy.      3. Identify the correct word given a set of 5-10 familiar words achieving greater than 90% accuracy.      4. Answer questions or follow directions presented auditorily regarding a known topic achieving greater than 90% accuracy.      5.  Identify the correct monosyllabic word given a set of 4-6 familiar words achieving greater than 90% accuracy.      6. Discriminate between words in which the initial consonant differ only in voicing (minimal pairs) achieving greater than 90% accuracy.      7. Follow instructions presented auditorily only containing two critical elements achieving greater than 90% accuracy.      8. Identify the correct phrase or sentence from a set of 4-8 achieving greater than 90% accuracy.      9. Read aloud directions/sentences changing one word- have patient determine the word that was changed in a common phrase achieving greater than 90% accuracy.      10.  Minimal pairs in sentences- have the patient discriminate between the two words achieving greater than 90% accuracy.      11.  Correctly imitate a 3-5 word phrase presented auditorily achieving greater than 90% accuracy.     12. Identify the topic of a short paragraph read aloud achieving greater than 90% accuracy.       Subjective:    Pt was seen this date for 30 minute speech perception therapy following activation of left cochlear implant on 3/7/2024. Patient reported that she is hearing beeping noises when sound is occurring. Clinician to reach out to audiology team with an update. Pt is completing streaming homework daily.    Objective:    The clinician streamed all therapy this date.    Ling sounds:    The clinician

## 2024-04-01 ENCOUNTER — APPOINTMENT (OUTPATIENT)
Dept: SPEECH THERAPY | Age: 59
End: 2024-04-01
Payer: COMMERCIAL

## 2024-04-08 ENCOUNTER — HOSPITAL ENCOUNTER (OUTPATIENT)
Dept: SPEECH THERAPY | Age: 59
Setting detail: THERAPIES SERIES
Discharge: HOME | End: 2024-04-08
Payer: COMMERCIAL

## 2024-04-08 PROCEDURE — 92507 TX SP LANG VOICE COMM INDIV: CPT | Performed by: SPEECH-LANGUAGE PATHOLOGIST

## 2024-04-08 NOTE — PROGRESS NOTES
SPEECH LANGUAGE PATHOLOGY  DAILY PROGRESS NOTE        PATIENT NAME:  Fara Phipps      :  1965          TODAY'S DATE:  2024     POC:    Detect all Ling-6 sounds at 3 ft., 6 ft., and 9 ft.      2. Discriminate between two words that differ in syllable length achieving greater than 90% accuracy.      3. Identify the correct word given a set of 5-10 familiar words achieving greater than 90% accuracy.      4. Answer questions or follow directions presented auditorily regarding a known topic achieving greater than 90% accuracy.      5.  Identify the correct monosyllabic word given a set of 4-6 familiar words achieving greater than 90% accuracy.      6. Discriminate between words in which the initial consonant differ only in voicing (minimal pairs) achieving greater than 90% accuracy.      7. Follow instructions presented auditorily only containing two critical elements achieving greater than 90% accuracy.      8. Identify the correct phrase or sentence from a set of 4-8 achieving greater than 90% accuracy.      9. Read aloud directions/sentences changing one word- have patient determine the word that was changed in a common phrase achieving greater than 90% accuracy.      10.  Minimal pairs in sentences- have the patient discriminate between the two words achieving greater than 90% accuracy.      11.  Correctly imitate a 3-5 word phrase presented auditorily achieving greater than 90% accuracy.     12. Identify the topic of a short paragraph read aloud achieving greater than 90% accuracy.       Subjective:    Pt was seen this date for 30 minute speech perception therapy following activation of left cochlear implant on 3/7/2024. Patient continues to practice streaming homework daily. Patient reports that beeping has become uncomfortable to the point of needing to remove the processor during the day- pt is still primarily hearing only beeps when practicing sounds and words at home.     Objective:    The

## 2024-04-12 ENCOUNTER — PROCEDURE VISIT (OUTPATIENT)
Dept: AUDIOLOGY | Age: 59
End: 2024-04-12
Payer: COMMERCIAL

## 2024-04-12 DIAGNOSIS — H90.41 SENSORINEURAL HEARING LOSS (SNHL) OF RIGHT EAR WITH UNRESTRICTED HEARING OF LEFT EAR: Primary | ICD-10-CM

## 2024-04-12 PROCEDURE — 92604 REPROGRAM COCHLEAR IMPLT 7/>: CPT

## 2024-04-12 NOTE — PROGRESS NOTES
Cochlear Audiology representative present for appointment.     Patient was here for CI follow up, after last appointment 3/22. Impedances were good, open electrodes (21, 15, 13, 8). Magnet site looked good.    Patient reported wearing processor all waking hours. Datalogging confirmed 9.7 hours.    Patient stated that she has been anxious and stressed due to cochlear implant only creating beeps and not words. Patient stated that she felt like it was a piercing sound recently in the last week instead of just a normal beeping sound. Connected patient to software and tilted lower frequency electrodes. Patient was satisfied with loudness and comfort.    Patient also reported feeling like there was something inside of her right ear (CI ear). Otoscopy revealed dried up cerumen with a little blood on the ear canal.     Battery estimation was 9 hours.     Due to patient not improving with cochlear implant, discussed with patient and cochlear representative the process of going to get a scan done to see where CI electrode is in the cochlea. Patient agreed to getting scan completed and read by otologist at .     Patient will follow up after scan has been reviewed by otologist. Will send information to [facility].    Sven Salgado/CCC-LINO  [place] Lic [id number]  Electronically signed by Sven Salgado on 4/12/2024 at 12:21 PM

## 2024-04-15 ENCOUNTER — HOSPITAL ENCOUNTER (OUTPATIENT)
Dept: SPEECH THERAPY | Age: 59
Setting detail: THERAPIES SERIES
Discharge: HOME | End: 2024-04-15
Payer: COMMERCIAL

## 2024-04-15 NOTE — PROGRESS NOTES
Speech-Language Pathology  Cancellation/No Show Note      For today's appointment patient:    [x]  Cancelled                  []  Rescheduled appointment    []  No show       []  Therapist cancelled             Reason given by patient:  []  No reason given  []  Conflicting appointment  []  No transportation  []  Conflict with work  []  Illness  []  Inclement weather   []  Insurance related issues  [x]  Other           Comments: Will resume speech therapy following results of CT scan.    Continue as per established POC    Annamaria Mills MS, CCC-SLP  4/15/2024

## 2024-04-22 ENCOUNTER — HOSPITAL ENCOUNTER (OUTPATIENT)
Dept: SPEECH THERAPY | Age: 59
Setting detail: THERAPIES SERIES
End: 2024-04-22
Payer: COMMERCIAL

## 2024-04-25 ENCOUNTER — TELEPHONE (OUTPATIENT)
Dept: AUDIOLOGY | Age: 59
End: 2024-04-25

## 2024-04-25 NOTE — TELEPHONE ENCOUNTER
Patient called and requested an update on when CT scan was going to happen. Called and spoke to audiology manager and she is reaching out to  to make sure they call to set up appointment for scan and otology. Called and LVM for patient that  will be reaching out to her.    Electronically signed by Sven Salgado on 4/25/2024 at 10:09 AM

## 2024-05-01 ASSESSMENT — PATIENT HEALTH QUESTIONNAIRE - PHQ9
9. THOUGHTS THAT YOU WOULD BE BETTER OFF DEAD, OR OF HURTING YOURSELF: NOT AT ALL
5. POOR APPETITE OR OVEREATING: NOT AT ALL
1. LITTLE INTEREST OR PLEASURE IN DOING THINGS: NOT AT ALL
SUM OF ALL RESPONSES TO PHQ QUESTIONS 1-9: 0
SUM OF ALL RESPONSES TO PHQ QUESTIONS 1-9: 0
SUM OF ALL RESPONSES TO PHQ9 QUESTIONS 1 & 2: 0
8. MOVING OR SPEAKING SO SLOWLY THAT OTHER PEOPLE COULD HAVE NOTICED. OR THE OPPOSITE, BEING SO FIGETY OR RESTLESS THAT YOU HAVE BEEN MOVING AROUND A LOT MORE THAN USUAL: NOT AT ALL
4. FEELING TIRED OR HAVING LITTLE ENERGY: NOT AT ALL
1. LITTLE INTEREST OR PLEASURE IN DOING THINGS: NOT AT ALL
3. TROUBLE FALLING OR STAYING ASLEEP: NOT AT ALL
8. MOVING OR SPEAKING SO SLOWLY THAT OTHER PEOPLE COULD HAVE NOTICED. OR THE OPPOSITE - BEING SO FIDGETY OR RESTLESS THAT YOU HAVE BEEN MOVING AROUND A LOT MORE THAN USUAL: NOT AT ALL
7. TROUBLE CONCENTRATING ON THINGS, SUCH AS READING THE NEWSPAPER OR WATCHING TELEVISION: NOT AT ALL
6. FEELING BAD ABOUT YOURSELF - OR THAT YOU ARE A FAILURE OR HAVE LET YOURSELF OR YOUR FAMILY DOWN: NOT AT ALL
4. FEELING TIRED OR HAVING LITTLE ENERGY: NOT AT ALL
2. FEELING DOWN, DEPRESSED OR HOPELESS: NOT AT ALL
SUM OF ALL RESPONSES TO PHQ QUESTIONS 1-9: 0
7. TROUBLE CONCENTRATING ON THINGS, SUCH AS READING THE NEWSPAPER OR WATCHING TELEVISION: NOT AT ALL
2. FEELING DOWN, DEPRESSED OR HOPELESS: NOT AT ALL
10. IF YOU CHECKED OFF ANY PROBLEMS, HOW DIFFICULT HAVE THESE PROBLEMS MADE IT FOR YOU TO DO YOUR WORK, TAKE CARE OF THINGS AT HOME, OR GET ALONG WITH OTHER PEOPLE: NOT DIFFICULT AT ALL
6. FEELING BAD ABOUT YOURSELF - OR THAT YOU ARE A FAILURE OR HAVE LET YOURSELF OR YOUR FAMILY DOWN: NOT AT ALL
SUM OF ALL RESPONSES TO PHQ QUESTIONS 1-9: 0
10. IF YOU CHECKED OFF ANY PROBLEMS, HOW DIFFICULT HAVE THESE PROBLEMS MADE IT FOR YOU TO DO YOUR WORK, TAKE CARE OF THINGS AT HOME, OR GET ALONG WITH OTHER PEOPLE: NOT DIFFICULT AT ALL
3. TROUBLE FALLING OR STAYING ASLEEP: NOT AT ALL
9. THOUGHTS THAT YOU WOULD BE BETTER OFF DEAD, OR OF HURTING YOURSELF: NOT AT ALL
SUM OF ALL RESPONSES TO PHQ QUESTIONS 1-9: 0
5. POOR APPETITE OR OVEREATING: NOT AT ALL

## 2024-05-03 ENCOUNTER — OFFICE VISIT (OUTPATIENT)
Dept: FAMILY MEDICINE CLINIC | Age: 59
End: 2024-05-03
Payer: COMMERCIAL

## 2024-05-03 VITALS
BODY MASS INDEX: 28.34 KG/M2 | OXYGEN SATURATION: 96 % | HEART RATE: 81 BPM | HEIGHT: 62 IN | DIASTOLIC BLOOD PRESSURE: 74 MMHG | SYSTOLIC BLOOD PRESSURE: 121 MMHG | TEMPERATURE: 79 F | WEIGHT: 154 LBS | RESPIRATION RATE: 20 BRPM

## 2024-05-03 DIAGNOSIS — Z28.21 HERPES ZOSTER VACCINATION DECLINED: ICD-10-CM

## 2024-05-03 DIAGNOSIS — H91.22 SUDDEN LEFT HEARING LOSS: ICD-10-CM

## 2024-05-03 DIAGNOSIS — H90.5 SENSORINEURAL HEARING LOSS (SNHL) OF RIGHT EAR, UNSPECIFIED HEARING STATUS ON CONTRALATERAL SIDE: ICD-10-CM

## 2024-05-03 DIAGNOSIS — F41.1 GAD (GENERALIZED ANXIETY DISORDER): ICD-10-CM

## 2024-05-03 DIAGNOSIS — Z71.89 ACP (ADVANCE CARE PLANNING): ICD-10-CM

## 2024-05-03 DIAGNOSIS — Z00.00 ENCOUNTER FOR WELL ADULT EXAM WITHOUT ABNORMAL FINDINGS: Primary | ICD-10-CM

## 2024-05-03 DIAGNOSIS — H90.3 SENSORINEURAL HEARING LOSS (SNHL) OF BOTH EARS: ICD-10-CM

## 2024-05-03 DIAGNOSIS — R63.5 WEIGHT GAIN: ICD-10-CM

## 2024-05-03 DIAGNOSIS — Z78.0 POST-MENOPAUSAL: ICD-10-CM

## 2024-05-03 DIAGNOSIS — E55.9 VITAMIN D DEFICIENCY: ICD-10-CM

## 2024-05-03 DIAGNOSIS — H90.3 SENSORY HEARING LOSS, BILATERAL: ICD-10-CM

## 2024-05-03 DIAGNOSIS — E78.00 PURE HYPERCHOLESTEROLEMIA: ICD-10-CM

## 2024-05-03 PROCEDURE — 99396 PREV VISIT EST AGE 40-64: CPT | Performed by: STUDENT IN AN ORGANIZED HEALTH CARE EDUCATION/TRAINING PROGRAM

## 2024-05-03 RX ORDER — ESTRADIOL 0.05 MG/D
PATCH TRANSDERMAL
COMMUNITY
Start: 2024-02-21

## 2024-05-03 SDOH — ECONOMIC STABILITY: FOOD INSECURITY: WITHIN THE PAST 12 MONTHS, YOU WORRIED THAT YOUR FOOD WOULD RUN OUT BEFORE YOU GOT MONEY TO BUY MORE.: NEVER TRUE

## 2024-05-03 SDOH — ECONOMIC STABILITY: FOOD INSECURITY: WITHIN THE PAST 12 MONTHS, THE FOOD YOU BOUGHT JUST DIDN'T LAST AND YOU DIDN'T HAVE MONEY TO GET MORE.: NEVER TRUE

## 2024-05-03 SDOH — ECONOMIC STABILITY: INCOME INSECURITY: HOW HARD IS IT FOR YOU TO PAY FOR THE VERY BASICS LIKE FOOD, HOUSING, MEDICAL CARE, AND HEATING?: NOT HARD AT ALL

## 2024-05-03 ASSESSMENT — ENCOUNTER SYMPTOMS
ABDOMINAL PAIN: 0
COUGH: 0
WHEEZING: 0
ABDOMINAL DISTENTION: 0
SHORTNESS OF BREATH: 0

## 2024-05-03 NOTE — PROGRESS NOTES
Well Adult Note  Name: Fara Phipps Today’s Date: 5/3/2024   MRN: [id number] Sex: Female   Age: 59 y.o. Ethnicity: Non- / Non    : 1965 Race: White (non-)      Fara Phipps is here for well adult exam.    1. Encounter for well adult exam without abnormal findings  Overall doing well, healthy relationship, try to eat healthy nutrition, has had some weight gain, postmenopausal, would like bone density screening, strong family history, did just have cochlear implant due to hearing loss of unknown cause, possibly autoimmune, is not currently exercising, does understand the importance of exercise, no side effects of medications or new symptoms today  3 get declined shingles inherent  2. ACP (advance care planning)  Documents provided  3. Sudden left hearing loss  4. Sensory hearing loss, bilateral  5. Sensorineural hearing loss (SNHL) of right ear, unspecified hearing status on contralateral side  Will get autoimmune workup as well as Lyme's testing for her sudden hearing loss, now with cochlear implant on the right, starting to have some loss on the left, did get high-dose steroids, did consult up-to-date, information below, will discuss outcome of labs through MyChart  -     ANCA Vasculitis Profile; Future  -     Rheumatoid Factor; Future  6. Post-menopausal  -     DEXA BONE DENSITY AXIAL SKELETON; Future  7. Vitamin D deficiency  -     Vitamin D 25 Hydroxy; Future  8. Pure hypercholesterolemia  -     Lipid Panel; Future  9. SUZANNE (generalized anxiety disorder)  -     Comprehensive Metabolic Panel; Future  -     CBC with Auto Differential; Future  10. Sensorineural hearing loss (SNHL) of both ears  -     Lyme Disease Acute Reflexive Panel; Future  -     CAMERON; Future  -     Sedimentation Rate; Future  -     C-Reactive Protein; Future  11. Weight gain  -     TSH; Future  12.  Herpes zoster vaccination declined    History:  Pt is here for follow up     Has cochlear implant

## 2024-05-03 NOTE — PATIENT INSTRUCTIONS
3796-9474 mg calcium per day, body only absorbs 500 mg at a time. Try to get this in your food  Low salt- <3000 mg a day   Increase plant protein and decreased animal protein  Weight bearing exercise  Decreased caffeine  Good Gut Health - lots of veggies and fruit (not corn peas and potatoes), probiotic    Balance training and fall prevention     Advance Care Planning     Advance Care Planning opens a door to talk about and write down your wishes before a sudden accident or illness.  Make your goals, values, and preferences known.     This puts you in the ’s seat and helps others know what matters most to you so they won’t have to guess.      Where can you learn more?    Go to [email or web address]   to learn how to:    Name someone you trust to make healthcare decisions for you, only if you can’t. (Healthcare Power of )    Document your wishes for care if you were seriously ill and not expected to recover or are approaching end of life. (Advance Directive or Living Will)    The same page can be found using the QR code below.                Well Visit, Ages 18 to 65: Care Instructions  Well visits can help you stay healthy. Your doctor has checked your overall health and may have suggested ways to take good care of yourself. Your doctor also may have recommended tests. You can help prevent illness with healthy eating, good sleep, vaccinations, regular exercise, and other steps.    Get the tests that you and your doctor decide on. Depending on your age and risks, examples might include screening for diabetes; hepatitis C; HIV; and cervical, breast, lung, and colon cancer. Screening helps find diseases before any symptoms appear.   Eat healthy foods. Choose fruits, vegetables, whole grains, lean protein, and low-fat dairy foods. Limit saturated fat and reduce salt.     Limit alcohol. Men should have no more than 2 drinks a day. Women should have no more than 1. For

## 2024-05-06 ENCOUNTER — TELEPHONE (OUTPATIENT)
Dept: AUDIOLOGY | Age: 59
End: 2024-05-06

## 2024-05-06 NOTE — TELEPHONE ENCOUNTER
Patient called to ask about CT scan/scheduling with . Reached out to  team to verify if patient has been called or not regarding imaging/otology appointment. Patient stated that she has been recently dizzy, happens throughout the day and lasts for a small amount of time, then go away. Advised patient to take off processor for 24 hours and see if that is what is causing the dizziness.     Electronically signed by Sven Salgado on 5/6/2024 at 12:52 PM

## 2024-05-16 ENCOUNTER — TELEPHONE (OUTPATIENT)
Dept: AUDIOLOGY | Age: 59
End: 2024-05-16

## 2024-05-16 NOTE — TELEPHONE ENCOUNTER
Reached out to patient to explain that I have been trying to contact otology team at  for help with cochlear implant. Called  cochlear implant navigator at  and left a voicemail to get back to me to call patient about imaging/setting up an appointment with otologist.     Electronically signed by Sven Salgado on 5/16/2024 at 12:09 PM

## 2024-05-21 DIAGNOSIS — H90.6 MIXED CONDUCTIVE AND SENSORINEURAL HEARING LOSS OF BOTH EARS: ICD-10-CM

## 2024-05-22 NOTE — PROGRESS NOTES
History of present illness:  Taniya Shipley is a 59 y.o. female presenting today for E/M of cochlear device check.  Her  accompanied her.  This is a patient who was referred from [facility] for some issues following her cochlear implant surgery on the right.    She has a history of sudden hearing loss in 2022 affecting the right ear following an upper respiratory tract infection at the time. She reported that at the time, an MRI was completed and was found to be normal.    Recently, in February of 2024, she underwent placement of Right cochlear implant-632 and was told that there was an inability to fully insert electrodes. However she did have the activation done. There was some sound perception, and that was quite limited.  She denies any balance issues.      The patient's current medications, active allergies and list of medical problems were reviewed in the EHR and confirmed electronically there are as follows;    Allergies:   No Known Allergies  Problem list:  There is no problem list on file for this patient.    Current list of medications:   Current Outpatient Medications   Medication Sig Dispense Refill    escitalopram (Lexapro) 10 mg tablet Take 1 tablet (10 mg) by mouth once daily.      estradiol (Climara) 0.05 mg/24 hr patch APPLY 1 PATCH TOPICALLY TO THE SKIN 1 TIME A WEEK      progesterone (Prometrium) 200 mg capsule Take 1 capsule (200 mg) by mouth once daily at bedtime.      Repatha Syringe 140 mg/mL injection INJECT 1 ML UNDER THE SKIN EVERY 14 DAYS       No current facility-administered medications for this visit.     Medical history:  No past medical history on file.  Surgical history:  No past surgical history on file.  Family history:  No family history on file.  Social history:  Social History     Tobacco Use    Smoking status: Never    Smokeless tobacco: Never       Physical Examination:  CONSTITUTIONAL:  No acute distress  VOICE:  No hoarseness or other abnormality  RESPIRATION:  Breathing  comfortably, no stridor  CV:  No clubbing/cyanosis/edema in hands  EYES:  EOM intact, sclera clear  NEURO:  Alert and oriented times 3, Cranial nerves II-XII grossly intact and symmetric bilaterally  HEAD AND FACE:  Symmetric facial features, no masses or lesions  RIGHT EAR:  Tympanic membrane is quite  atelectactic. The wire of the electrodes is seen underneath the tympanic membrane, but no definite extrusion at this time. No infection, no effusion. I am able to fully insufflate.  LEFT EAR:  Incision site is healed. Cochlear implant site looks good, magnet with proper strength.  NOSE:  Anterior rhinoscopy clear, no significant external deformity.  ORAL CAVITY/OROPHARYNX/LIPS:  normal lining, mobile tongue.  PHARYNGEAL WALLS: Moist mucosal lining, good palatal elevation  NECK/LYMPH:  No LAD, no thyroid masses, trachea midline  SKIN:  Neck and facial skin is without scar or injury  PSYCH:  Alert and oriented with appropriate mood and affect    Diagnostic testing:  High resolution Temporal Bone CT scan completed revealed:  Active electrode is actually mostly outside the cochlea. There may be small component in the proximal portion of the Basilar turn that might be folded and a significant part of the wire is in the media.    I personally reviewed the available patient's external record and independently reviewed their audiometric testing [and] radiographic imaging through the appropriate viewing software as detailed in my note and agree with the detailed report.      Impression:  Status post right cochlear Implantation  Right sensorineural hearing loss   Electrode extrusion, or malposition  Right chronic Otitis Media, atelectatic  Right Chronic Eustachian Tube Dysfunction        Recommendation:  I had a lengthy discussion with the patient and her significant other about treatment options. It is possible to explore reimplantation, but we reviewed the risk of incomplete extrusion due to intraocular scarring, fibrosis or  even prior translocation as that cannot be detected prior to surgery.  We also reviewed the risk of taste disturbance. She already has some of that at this time and this may worsen finally because of the physical findings.  I would recommend the first stage cartilage tympanoplasty to address the atelectactic tympanic membrane prior to devising your cochlear implant.  I also advised her that she should establish care with our cochlear implant team and if she decides to pursue that, this would be the first step.  She expressed understanding and all questions were answered to her satisfaction.    I discussed with the patient the complexity of my medical decision making including the treatment and testing rational, indications of their elective procedure and possible adverse effects and/or complications. Based on the provided documentation and my professional assessment of this patient's [acute condition/acute exacerbation of their chronic condition/newly diagnosed condition/progression of their condition/complicated course of their medical condition], the complexity of evaluation and treatment is [low-moderate-high].    This note was created using speech recognition transcription software. Despite proofreading, several typographical errors might be present that might affect the meaning of the content. Please call with any questions.      Scribe Attestation  By signing my name below, IMary , Dre attest that this documentation has been prepared under the direction and in the presence of Los Montgomery MD.    Scribe Attestation  By signing my name below, Darya POLK, Dre   attest that this documentation has been prepared under the direction and in the presence of Los Montgomery MD.

## 2024-05-23 ENCOUNTER — OFFICE VISIT (OUTPATIENT)
Dept: OTOLARYNGOLOGY | Facility: CLINIC | Age: 59
End: 2024-05-23
Payer: COMMERCIAL

## 2024-05-23 ENCOUNTER — TELEPHONE (OUTPATIENT)
Dept: AUDIOLOGY | Age: 59
End: 2024-05-23

## 2024-05-23 ENCOUNTER — HOSPITAL ENCOUNTER (OUTPATIENT)
Dept: RADIOLOGY | Facility: CLINIC | Age: 59
Discharge: HOME | End: 2024-05-23
Payer: COMMERCIAL

## 2024-05-23 VITALS — WEIGHT: 150 LBS

## 2024-05-23 DIAGNOSIS — Z96.21 COCHLEAR IMPLANT IN PLACE: ICD-10-CM

## 2024-05-23 DIAGNOSIS — H90.6 MIXED CONDUCTIVE AND SENSORINEURAL HEARING LOSS OF BOTH EARS: ICD-10-CM

## 2024-05-23 DIAGNOSIS — H90.3 ASYMMETRICAL SENSORINEURAL HEARING LOSS: Primary | ICD-10-CM

## 2024-05-23 PROCEDURE — 70480 CT ORBIT/EAR/FOSSA W/O DYE: CPT | Performed by: RADIOLOGY

## 2024-05-23 PROCEDURE — 99204 OFFICE O/P NEW MOD 45 MIN: CPT | Performed by: OTOLARYNGOLOGY

## 2024-05-23 PROCEDURE — 70480 CT ORBIT/EAR/FOSSA W/O DYE: CPT

## 2024-05-23 PROCEDURE — 1036F TOBACCO NON-USER: CPT | Performed by: OTOLARYNGOLOGY

## 2024-05-23 RX ORDER — ESCITALOPRAM OXALATE 10 MG/1
10 TABLET ORAL DAILY
COMMUNITY
Start: 2024-05-13

## 2024-05-23 RX ORDER — PROGESTERONE 200 MG/1
200 CAPSULE ORAL NIGHTLY
COMMUNITY
Start: 2024-05-01

## 2024-05-23 RX ORDER — EVOLOCUMAB 140 MG/ML
INJECTION, SOLUTION SUBCUTANEOUS
COMMUNITY
Start: 2024-03-26

## 2024-05-23 RX ORDER — ESTRADIOL 0.05 MG/D
PATCH TRANSDERMAL
COMMUNITY
Start: 2023-12-01

## 2024-05-23 ASSESSMENT — PATIENT HEALTH QUESTIONNAIRE - PHQ9
2. FEELING DOWN, DEPRESSED OR HOPELESS: NOT AT ALL
SUM OF ALL RESPONSES TO PHQ9 QUESTIONS 1 AND 2: 0
1. LITTLE INTEREST OR PLEASURE IN DOING THINGS: NOT AT ALL

## 2024-05-23 NOTE — TELEPHONE ENCOUNTER
Patient called and reported that she went to  for imaging and to see the physician. Patient reported that physician stated she had a collapsed eardrum and that the electrode for her cochlear implant was not in the cochlea and has been moving out. Patient stated physician gave her the option of going in and trying a different device or removing the device all together. Physician stated that they would have her continue care with audiology/speech team at  until physician feels he is able to transition care back to audiology at [facility]. Patient asked audiologist risks of keeping electrode in and not removing it with surgery, and was explained the risks of not removing current device. Patient will update [facility] audiologist about plans and moving forward with CI.     Electronically signed by Sven Salgado on 5/23/2024 at 2:25 PM

## 2024-07-05 DIAGNOSIS — Z78.0 POST-MENOPAUSAL: ICD-10-CM

## 2024-07-17 ENCOUNTER — TELEPHONE (OUTPATIENT)
Dept: AUDIOLOGY | Age: 59
End: 2024-07-17

## 2024-07-17 NOTE — TELEPHONE ENCOUNTER
Patient called and LVM stating that she had a question for CI audiologist. Called patient and she reported that she has been hearing a clicking noise for the past few days and was wondering if it was something she should be worried about. I explained to her that she should call  and report this new symptom to them so that they have it documented she is having that new onset symptom on her surgical ear. Patient also reported that she is planning to undergo surgery with  and will keep CI audiologist posted on what is happening with surgery and when. They are waiting until patient's 's insurance changes over.     Electronically signed by Sven Salgado on 7/17/2024 at 8:54 AM

## 2024-10-03 DIAGNOSIS — H90.5 SENSORINEURAL HEARING LOSS (SNHL) OF RIGHT EAR, UNSPECIFIED HEARING STATUS ON CONTRALATERAL SIDE: ICD-10-CM

## 2024-10-03 DIAGNOSIS — E55.9 VITAMIN D DEFICIENCY: ICD-10-CM

## 2024-10-03 DIAGNOSIS — H90.3 SENSORINEURAL HEARING LOSS (SNHL) OF BOTH EARS: ICD-10-CM

## 2024-10-03 LAB
ALBUMIN: 4.6 G/DL (ref 3.5–5.2)
ALP BLD-CCNC: 66 U/L (ref 35–104)
ALT SERPL-CCNC: 15 U/L (ref 0–32)
ANION GAP SERPL CALCULATED.3IONS-SCNC: 14 MMOL/L (ref 7–16)
AST SERPL-CCNC: 23 U/L (ref 0–31)
BASOPHILS ABSOLUTE: 0.01 K/UL (ref 0–0.2)
BASOPHILS RELATIVE PERCENT: 0 % (ref 0–2)
BILIRUB SERPL-MCNC: 0.4 MG/DL (ref 0–1.2)
BUN BLDV-MCNC: 10 MG/DL (ref 6–20)
C-REACTIVE PROTEIN: <3 MG/L (ref 0–5)
CALCIUM SERPL-MCNC: 9.3 MG/DL (ref 8.6–10.2)
CHLORIDE BLD-SCNC: 99 MMOL/L (ref 98–107)
CHOLESTEROL, TOTAL: 188 MG/DL
CO2: 24 MMOL/L (ref 22–29)
CREAT SERPL-MCNC: 0.8 MG/DL (ref 0.5–1)
EOSINOPHILS ABSOLUTE: 0.08 K/UL (ref 0.05–0.5)
EOSINOPHILS RELATIVE PERCENT: 2 % (ref 0–6)
GFR, ESTIMATED: 81 ML/MIN/1.73M2
GLUCOSE BLD-MCNC: 84 MG/DL (ref 74–99)
HCT VFR BLD CALC: 42.4 % (ref 34–48)
HDLC SERPL-MCNC: 51 MG/DL
HEMOGLOBIN: 13.4 G/DL (ref 11.5–15.5)
IMMATURE GRANULOCYTES %: 0 % (ref 0–5)
IMMATURE GRANULOCYTES ABSOLUTE: <0.03 K/UL (ref 0–0.58)
LDL CHOLESTEROL: 118 MG/DL
LYMPHOCYTES ABSOLUTE: 0.99 K/UL (ref 1.5–4)
LYMPHOCYTES RELATIVE PERCENT: 24 % (ref 20–42)
MCH RBC QN AUTO: 30.6 PG (ref 26–35)
MCHC RBC AUTO-ENTMCNC: 31.6 G/DL (ref 32–34.5)
MCV RBC AUTO: 96.8 FL (ref 80–99.9)
MONOCYTES ABSOLUTE: 0.4 K/UL (ref 0.1–0.95)
MONOCYTES RELATIVE PERCENT: 10 % (ref 2–12)
NEUTROPHILS ABSOLUTE: 2.61 K/UL (ref 1.8–7.3)
NEUTROPHILS RELATIVE PERCENT: 64 % (ref 43–80)
PDW BLD-RTO: 13 % (ref 11.5–15)
PLATELET # BLD: 365 K/UL (ref 130–450)
PMV BLD AUTO: 10.3 FL (ref 7–12)
POTASSIUM SERPL-SCNC: 3.8 MMOL/L (ref 3.5–5)
RBC # BLD: 4.38 M/UL (ref 3.5–5.5)
RHEUMATOID FACTOR: <10 IU/ML (ref 0–13)
SED RATE, AUTOMATED: 6 MM/HR (ref 0–20)
SODIUM BLD-SCNC: 137 MMOL/L (ref 132–146)
TOTAL PROTEIN: 7.3 G/DL (ref 6.4–8.3)
TRIGL SERPL-MCNC: 96 MG/DL
TSH SERPL DL<=0.05 MIU/L-ACNC: 1.59 UIU/ML (ref 0.27–4.2)
VITAMIN D 25-HYDROXY: 66.4 NG/ML (ref 30–100)
VLDLC SERPL CALC-MCNC: 19 MG/DL
WBC # BLD: 4.1 K/UL (ref 4.5–11.5)

## 2024-10-04 LAB — ANTI-NUCLEAR ANTIBODY (ANA): NEGATIVE

## 2024-10-08 LAB — BORRELIA BURGDORFERI ABS TOTAL: 0.15 IV

## 2024-10-12 LAB
SEND OUT REPORT: NORMAL
TEST NAME: NORMAL

## 2024-12-08 DIAGNOSIS — E78.01 FAMILIAL HYPERCHOLESTEROLEMIA: ICD-10-CM

## 2024-12-08 DIAGNOSIS — T46.6X5A STATIN MYOPATHY: ICD-10-CM

## 2024-12-08 DIAGNOSIS — G72.0 STATIN MYOPATHY: ICD-10-CM

## 2024-12-09 RX ORDER — EVOLOCUMAB 140 MG/ML
INJECTION, SOLUTION SUBCUTANEOUS
Qty: 6 ML | Refills: 1 | Status: SHIPPED | OUTPATIENT
Start: 2024-12-09

## 2024-12-09 NOTE — TELEPHONE ENCOUNTER
Name of Medication(s) Requested:  Requested Prescriptions     Pending Prescriptions Disp Refills    Evolocumab (REPATHA) SOSY syringe [Pharmacy Med Name: REPATHA 140MG/ML PREFILLED SYRINGE] 6 mL 1     Sig: INJECT 1 ML UNDER THE SKIN EVERY 14 DAYS       Medication is on current medication list Yes    Dosage and directions were verified? Yes    Quantity verified: 90 day supply     Pharmacy Verified?  Yes    Last Appointment:  5/3/2024    Future appts:  Future Appointments   Date Time Provider Department Center   4/11/2025  7:30 AM [facility]   5/9/2025  8:00 AM Jazzmine Doan MD [facility] DEP   6/23/2025  2:45 PM Jaime Boone PA Plastics [facility]        (If no appt send self scheduling link. .REFILLAPPT)  Scheduling request sent?     [] Yes  [x] No    Does patient need updated?  [] Yes  [x] No

## 2025-03-18 ENCOUNTER — OFFICE VISIT (OUTPATIENT)
Dept: FAMILY MEDICINE CLINIC | Age: 60
End: 2025-03-18

## 2025-03-18 VITALS
RESPIRATION RATE: 17 BRPM | OXYGEN SATURATION: 98 % | TEMPERATURE: 97.6 F | DIASTOLIC BLOOD PRESSURE: 71 MMHG | WEIGHT: 149 LBS | BODY MASS INDEX: 27.42 KG/M2 | HEIGHT: 62 IN | HEART RATE: 88 BPM | SYSTOLIC BLOOD PRESSURE: 112 MMHG

## 2025-03-18 DIAGNOSIS — E78.00 PURE HYPERCHOLESTEROLEMIA: ICD-10-CM

## 2025-03-18 DIAGNOSIS — H90.5 SENSORINEURAL HEARING LOSS (SNHL) OF RIGHT EAR, UNSPECIFIED HEARING STATUS ON CONTRALATERAL SIDE: ICD-10-CM

## 2025-03-18 DIAGNOSIS — Z01.818 PRE-OP EVALUATION: Primary | ICD-10-CM

## 2025-03-18 DIAGNOSIS — R63.4 WEIGHT LOSS: ICD-10-CM

## 2025-03-18 DIAGNOSIS — E55.9 VITAMIN D DEFICIENCY: ICD-10-CM

## 2025-03-18 SDOH — ECONOMIC STABILITY: FOOD INSECURITY: WITHIN THE PAST 12 MONTHS, THE FOOD YOU BOUGHT JUST DIDN'T LAST AND YOU DIDN'T HAVE MONEY TO GET MORE.: NEVER TRUE

## 2025-03-18 SDOH — ECONOMIC STABILITY: FOOD INSECURITY: WITHIN THE PAST 12 MONTHS, YOU WORRIED THAT YOUR FOOD WOULD RUN OUT BEFORE YOU GOT MONEY TO BUY MORE.: NEVER TRUE

## 2025-03-18 ASSESSMENT — ENCOUNTER SYMPTOMS
ABDOMINAL DISTENTION: 0
SHORTNESS OF BREATH: 0
COUGH: 0
WHEEZING: 0
ABDOMINAL PAIN: 0

## 2025-03-18 ASSESSMENT — PATIENT HEALTH QUESTIONNAIRE - PHQ9: DEPRESSION UNABLE TO ASSESS: PT REFUSES

## 2025-03-18 NOTE — PROGRESS NOTES
04/20/2023 0.01 E9/L Final     Immature Granulocytes %   Date Value Ref Range Status   10/03/2024 0 0.0 - 5.0 % Final   04/20/2023 0.3 0.0 - 5.0 % Final     Lymphocyte %   Date Value Ref Range Status   10/12/2023 22.8 % Final     Lymphocytes %   Date Value Ref Range Status   10/03/2024 24 20.0 - 42.0 % Final     Monocytes %   Date Value Ref Range Status   10/03/2024 10 2.0 - 12.0 % Final     Basophils %   Date Value Ref Range Status   10/03/2024 0 0.0 - 2.0 % Final     Neutrophils Absolute   Date Value Ref Range Status   10/03/2024 2.61 1.80 - 7.30 k/uL Final     Lymphocytes Absolute   Date Value Ref Range Status   10/03/2024 0.99 (L) 1.50 - 4.00 k/uL Final     Monocytes Absolute   Date Value Ref Range Status   10/03/2024 0.40 0.10 - 0.95 k/uL Final     Eosinophils Absolute   Date Value Ref Range Status   10/03/2024 0.08 0.05 - 0.50 k/uL Final     Basophils Absolute   Date Value Ref Range Status   10/03/2024 0.01 0.00 - 0.20 k/uL Final       Lab Results   Component Value Date    LABA1C 5.4 11/21/2017    LABA1C 5.5 11/09/2016    LABA1C 5.4 10/16/2015     Lab Results   Component Value Date    CHOL 188 10/03/2024    CHOL 169 10/31/2023    CHOL 304 (H) 04/20/2023     Lab Results   Component Value Date    TRIG 96 10/03/2024    TRIG 91 10/31/2023    TRIG 79 04/20/2023     Lab Results   Component Value Date    HDL 51 10/03/2024    HDL 48 10/31/2023    HDL 61 04/20/2023     No components found for: \"LDLCHOLESTEROL\", \"LDLCALC\"  Lab Results   Component Value Date    VLDL 19 10/03/2024    VLDL 18 10/31/2023    VLDL 16 04/20/2023     No results found for: \"CHOLHDLRATIO\"   Creatinine   Date Value Ref Range Status   10/03/2024 0.8 0.50 - 1.00 mg/dL Final   10/12/2023 0.75 0.60 - 1.30 mg/dL Final   04/20/2023 0.8 0.5 - 1.0 mg/dL Final       The 10-year ASCVD risk score (Nancy KINGSTON, et al., 2019) is: 2.3%    Values used to calculate the score:      Age: 59 years      Sex: Female      Is Non- : No

## 2025-03-19 ENCOUNTER — PATIENT MESSAGE (OUTPATIENT)
Dept: FAMILY MEDICINE CLINIC | Age: 60
End: 2025-03-19

## 2025-04-18 ENCOUNTER — OFFICE VISIT (OUTPATIENT)
Dept: SURGERY | Age: 60
End: 2025-04-18
Payer: COMMERCIAL

## 2025-04-18 VITALS
HEART RATE: 89 BPM | DIASTOLIC BLOOD PRESSURE: 70 MMHG | TEMPERATURE: 97.4 F | WEIGHT: 144.9 LBS | OXYGEN SATURATION: 97 % | BODY MASS INDEX: 26.67 KG/M2 | SYSTOLIC BLOOD PRESSURE: 122 MMHG | RESPIRATION RATE: 18 BRPM | HEIGHT: 62 IN

## 2025-04-18 DIAGNOSIS — N62 MACROMASTIA: Primary | ICD-10-CM

## 2025-04-18 PROCEDURE — 99203 OFFICE O/P NEW LOW 30 MIN: CPT | Performed by: PHYSICIAN ASSISTANT

## 2025-04-18 NOTE — PROGRESS NOTES
Department of Plastic Surgery - Adult  Attending Consult Note        CHIEF COMPLAINT:  Symptomatic Macromastia    History Obtained From:  patient    HISTORY OF PRESENT ILLNESS:                The patient is a 59 y.o. female who presents with bilateral symptomatic macromastia.  The patient complains of back pain, neck pain, shoulder pain, shoulder grooving, and intertrigo , Intermittent numbness and tingling in bilateral hands and arms.  She does admit to having increased headaches over the past several months which she associated with her breast symptomatolgy.  The patient states that she has been dealing with these associated symptoms , for 30+ years.  She is a currently a size DD cup.  She has undergone a trial of weight loss and NSAIDS, She states that she has done conservative therapy for  over 12 months.  She also admits to feeling as though she has a hunched back from the pull and strain of her breast.  She states that her weight does fluctuate, but to no discernable change in her breast symptomology.  She states she has been at a stable weight for greater than 6 months.  She is  not a diabetic.  The pt states the weight and size of her breasts are effecting her ADLS.  She currently works as a nurse .  The patient states that the weight and size and pull of her breast limits her ability to work to her fullest potential. She states she also is limited to maintaining good hygiene to her bilateral breast inframammary folds, and this has been effecting her for many years.  She does use OTC powders and creams for her bilateral breast inframmamary fold rashes, but she states none of these have helped aid in her symptoms.  She does have refractory symtoms after using powders and ointments.  The pt does a self breast exam frequently.   The patient  denies any family history of breast cancer.  The patient denies any personal history of breast disease.    Past Medical History:    Past Medical History:   Diagnosis Date

## 2025-04-29 ENCOUNTER — TELEPHONE (OUTPATIENT)
Dept: SURGERY | Age: 60
End: 2025-04-29

## 2025-04-29 NOTE — TELEPHONE ENCOUNTER
Per patient would like surgery when approved for breast reduction end of August or sept., once authorization completed we can then proceed with scheduling

## 2025-06-04 DIAGNOSIS — T46.6X5A STATIN MYOPATHY: ICD-10-CM

## 2025-06-04 DIAGNOSIS — G72.0 STATIN MYOPATHY: ICD-10-CM

## 2025-06-04 DIAGNOSIS — E78.01 FAMILIAL HYPERCHOLESTEROLEMIA: ICD-10-CM

## 2025-06-04 RX ORDER — EVOLOCUMAB 140 MG/ML
140 INJECTION, SOLUTION SUBCUTANEOUS
Qty: 6 ML | Refills: 1 | Status: SHIPPED | OUTPATIENT
Start: 2025-06-04

## 2025-06-04 NOTE — TELEPHONE ENCOUNTER
Name of Medication(s) Requested:  Requested Prescriptions     Pending Prescriptions Disp Refills    Evolocumab (REPATHA) SOSY syringe 6 mL 1     Sig: Inject 1 mL into the skin every 14 days       Medication is on current medication list Yes    Dosage and directions were verified? Yes    Quantity verified: 90 day supply     Pharmacy Verified?  Yes    Last Appointment:  3/18/2025    Future appts:  Future Appointments   Date Time Provider Department Center   10/24/2025  8:15 AM Nick Byrne MD [facility]   10/24/2025  9:40 AM [facility]        (If no appt send self scheduling link. .REFILLAPPT)  Scheduling request sent?     [] Yes  [x] No    Does patient need updated?  [] Yes  [x] No

## 2025-06-17 ENCOUNTER — TELEPHONE (OUTPATIENT)
Dept: AUDIOLOGY | Age: 60
End: 2025-06-17

## 2025-06-17 NOTE — TELEPHONE ENCOUNTER
Pt called and wanted to know if she could transition her audiological care back to [facility] for her CI. She had her revision surgery with Dr. Weaver in March 2025 and prefers to be closer to home for her audiological testing. She is going to contact Dr. Weaver's office to see if she needs a referral to come back to us for programming. Will wait for patient to return phone call and get patient on the schedule for appointment.     Electronically signed by Sven Salgado on 6/17/2025 at 10:16 AM

## 2025-07-08 ENCOUNTER — TELEPHONE (OUTPATIENT)
Dept: SURGERY | Age: 60
End: 2025-07-08

## 2025-07-08 NOTE — TELEPHONE ENCOUNTER
Office left message with patient regarding insurance. Needed to verify that [employer] was her primary insurance.

## 2025-07-09 ENCOUNTER — TELEPHONE (OUTPATIENT)
Dept: SURGERY | Age: 60
End: 2025-07-09

## 2025-07-09 NOTE — TELEPHONE ENCOUNTER
Patient has [employer] and that is her primary insurance. She was hoping that she would be able to have her surgery scheduled at the end of August, if possible. She may not have the [employer] into the fall.

## 2025-08-01 ENCOUNTER — TELEPHONE (OUTPATIENT)
Dept: SURGERY | Age: 60
End: 2025-08-01

## 2025-08-01 PROBLEM — N62 MACROMASTIA: Status: ACTIVE | Noted: 2025-08-01

## 2025-08-01 NOTE — TELEPHONE ENCOUNTER
Lino., scheduled with Dr. Newman prior to surgery scheduled 8/22/25    Surgery has been scheduled on 8/25/25 at [facility], [address] [place] [place]. Pre-Admission Testing will call you prior to surgery to inform you arrival time and any other additional directions, if they are unable to reach you, please call them two days prior at [phone]. If taking Fish Oil, Vitamins, two weeks prior to surgery stop taking. If taking NSAIDS (such as Aspirin, Ibuprofen)  anticoagulants please consult with your prescribing physician to get further instructions on when to stop medication prior to surgery that is scheduled, patient understood.    Pre-Auth #:  CPT Codes:  ICD-10 Codes:

## 2025-08-22 ENCOUNTER — ANESTHESIA EVENT (OUTPATIENT)
Dept: OPERATING ROOM | Age: 60
End: 2025-08-22
Payer: COMMERCIAL

## 2025-08-22 ENCOUNTER — OFFICE VISIT (OUTPATIENT)
Dept: SURGERY | Age: 60
End: 2025-08-22
Payer: COMMERCIAL

## 2025-08-22 VITALS — TEMPERATURE: 98.4 F

## 2025-08-22 DIAGNOSIS — N62 MACROMASTIA: Primary | ICD-10-CM

## 2025-08-22 PROCEDURE — 99213 OFFICE O/P EST LOW 20 MIN: CPT | Performed by: PLASTIC SURGERY

## 2025-08-25 ENCOUNTER — ANESTHESIA (OUTPATIENT)
Dept: OPERATING ROOM | Age: 60
End: 2025-08-25
Payer: COMMERCIAL

## 2025-08-25 ENCOUNTER — HOSPITAL ENCOUNTER (OUTPATIENT)
Age: 60
Setting detail: OUTPATIENT SURGERY
Discharge: HOME | End: 2025-08-25
Attending: PLASTIC SURGERY | Admitting: PLASTIC SURGERY
Payer: COMMERCIAL

## 2025-08-25 VITALS
WEIGHT: 143 LBS | OXYGEN SATURATION: 100 % | HEIGHT: 62 IN | TEMPERATURE: 96.8 F | RESPIRATION RATE: 18 BRPM | HEART RATE: 78 BPM | SYSTOLIC BLOOD PRESSURE: 103 MMHG | DIASTOLIC BLOOD PRESSURE: 50 MMHG | BODY MASS INDEX: 26.31 KG/M2

## 2025-08-25 DIAGNOSIS — G89.18 POST-OP PAIN: Primary | ICD-10-CM

## 2025-08-25 DIAGNOSIS — N62 MACROMASTIA: ICD-10-CM

## 2025-08-25 PROCEDURE — 6370000000 HC RX 637 (ALT 250 FOR IP): Performed by: ANESTHESIOLOGY

## 2025-08-25 PROCEDURE — 2580000003 HC RX 258: Performed by: NURSE ANESTHETIST, CERTIFIED REGISTERED

## 2025-08-25 PROCEDURE — 2500000003 HC RX 250 WO HCPCS: Performed by: NURSE ANESTHETIST, CERTIFIED REGISTERED

## 2025-08-25 PROCEDURE — 3600000005 HC SURGERY LEVEL 5 BASE: Performed by: PLASTIC SURGERY

## 2025-08-25 PROCEDURE — 2500000003 HC RX 250 WO HCPCS: Performed by: PLASTIC SURGERY

## 2025-08-25 PROCEDURE — 7100000011 HC PHASE II RECOVERY - ADDTL 15 MIN: Performed by: PLASTIC SURGERY

## 2025-08-25 PROCEDURE — 2720000010 HC SURG SUPPLY STERILE: Performed by: PLASTIC SURGERY

## 2025-08-25 PROCEDURE — 7100000001 HC PACU RECOVERY - ADDTL 15 MIN: Performed by: PLASTIC SURGERY

## 2025-08-25 PROCEDURE — 3700000001 HC ADD 15 MINUTES (ANESTHESIA): Performed by: PLASTIC SURGERY

## 2025-08-25 PROCEDURE — 7100000010 HC PHASE II RECOVERY - FIRST 15 MIN: Performed by: PLASTIC SURGERY

## 2025-08-25 PROCEDURE — 6360000002 HC RX W HCPCS: Performed by: PLASTIC SURGERY

## 2025-08-25 PROCEDURE — 3600000015 HC SURGERY LEVEL 5 ADDTL 15MIN: Performed by: PLASTIC SURGERY

## 2025-08-25 PROCEDURE — 19318 BREAST REDUCTION: CPT | Performed by: PHYSICIAN ASSISTANT

## 2025-08-25 PROCEDURE — 19318 BREAST REDUCTION: CPT | Performed by: PLASTIC SURGERY

## 2025-08-25 PROCEDURE — 7100000000 HC PACU RECOVERY - FIRST 15 MIN: Performed by: PLASTIC SURGERY

## 2025-08-25 PROCEDURE — 3700000000 HC ANESTHESIA ATTENDED CARE: Performed by: PLASTIC SURGERY

## 2025-08-25 PROCEDURE — 2580000003 HC RX 258: Performed by: ANESTHESIOLOGY

## 2025-08-25 PROCEDURE — 6360000002 HC RX W HCPCS: Performed by: NURSE ANESTHETIST, CERTIFIED REGISTERED

## 2025-08-25 PROCEDURE — 88305 TISSUE EXAM BY PATHOLOGIST: CPT

## 2025-08-25 PROCEDURE — 2709999900 HC NON-CHARGEABLE SUPPLY: Performed by: PLASTIC SURGERY

## 2025-08-25 RX ORDER — OXYCODONE HYDROCHLORIDE 5 MG/1
5 TABLET ORAL PRN
Status: COMPLETED | OUTPATIENT
Start: 2025-08-25 | End: 2025-08-25

## 2025-08-25 RX ORDER — ROCURONIUM BROMIDE 10 MG/ML
INJECTION, SOLUTION INTRAVENOUS
Status: DISCONTINUED | OUTPATIENT
Start: 2025-08-25 | End: 2025-08-25 | Stop reason: SDUPTHER

## 2025-08-25 RX ORDER — SODIUM CHLORIDE 9 MG/ML
INJECTION, SOLUTION INTRAVENOUS PRN
Status: DISCONTINUED | OUTPATIENT
Start: 2025-08-25 | End: 2025-08-25 | Stop reason: HOSPADM

## 2025-08-25 RX ORDER — SODIUM CHLORIDE 9 MG/ML
INJECTION, SOLUTION INTRAVENOUS CONTINUOUS
Status: DISCONTINUED | OUTPATIENT
Start: 2025-08-25 | End: 2025-08-25 | Stop reason: HOSPADM

## 2025-08-25 RX ORDER — DEXAMETHASONE SODIUM PHOSPHATE 10 MG/ML
INJECTION, SOLUTION INTRAMUSCULAR; INTRAVENOUS
Status: DISCONTINUED | OUTPATIENT
Start: 2025-08-25 | End: 2025-08-25 | Stop reason: SDUPTHER

## 2025-08-25 RX ORDER — LIDOCAINE HYDROCHLORIDE 20 MG/ML
INJECTION, SOLUTION EPIDURAL; INFILTRATION; INTRACAUDAL; PERINEURAL
Status: DISCONTINUED | OUTPATIENT
Start: 2025-08-25 | End: 2025-08-25 | Stop reason: SDUPTHER

## 2025-08-25 RX ORDER — MIDAZOLAM HYDROCHLORIDE 1 MG/ML
INJECTION, SOLUTION INTRAMUSCULAR; INTRAVENOUS
Status: DISCONTINUED | OUTPATIENT
Start: 2025-08-25 | End: 2025-08-25 | Stop reason: SDUPTHER

## 2025-08-25 RX ORDER — ONDANSETRON 4 MG/1
4 TABLET, FILM COATED ORAL DAILY PRN
Qty: 12 TABLET | Refills: 1 | Status: SHIPPED | OUTPATIENT
Start: 2025-08-25

## 2025-08-25 RX ORDER — DIPHENHYDRAMINE HYDROCHLORIDE 50 MG/ML
INJECTION, SOLUTION INTRAMUSCULAR; INTRAVENOUS
Status: DISCONTINUED | OUTPATIENT
Start: 2025-08-25 | End: 2025-08-25 | Stop reason: SDUPTHER

## 2025-08-25 RX ORDER — PROCHLORPERAZINE EDISYLATE 5 MG/ML
5 INJECTION INTRAMUSCULAR; INTRAVENOUS
Status: DISCONTINUED | OUTPATIENT
Start: 2025-08-25 | End: 2025-08-25 | Stop reason: HOSPADM

## 2025-08-25 RX ORDER — FENTANYL CITRATE 50 UG/ML
INJECTION, SOLUTION INTRAMUSCULAR; INTRAVENOUS
Status: DISCONTINUED | OUTPATIENT
Start: 2025-08-25 | End: 2025-08-25 | Stop reason: SDUPTHER

## 2025-08-25 RX ORDER — SODIUM CHLORIDE 0.9 % (FLUSH) 0.9 %
5-40 SYRINGE (ML) INJECTION EVERY 12 HOURS SCHEDULED
Status: DISCONTINUED | OUTPATIENT
Start: 2025-08-25 | End: 2025-08-25 | Stop reason: HOSPADM

## 2025-08-25 RX ORDER — CEPHALEXIN 500 MG/1
500 CAPSULE ORAL 4 TIMES DAILY
Qty: 20 CAPSULE | Refills: 0 | Status: SHIPPED | OUTPATIENT
Start: 2025-08-25 | End: 2025-08-30

## 2025-08-25 RX ORDER — NEOSTIGMINE METHYLSULFATE 5 MG/5 ML
SYRINGE (ML) INTRAVENOUS
Status: DISCONTINUED | OUTPATIENT
Start: 2025-08-25 | End: 2025-08-25 | Stop reason: SDUPTHER

## 2025-08-25 RX ORDER — PROPOFOL 10 MG/ML
INJECTION, EMULSION INTRAVENOUS
Status: DISCONTINUED | OUTPATIENT
Start: 2025-08-25 | End: 2025-08-25 | Stop reason: SDUPTHER

## 2025-08-25 RX ORDER — GLYCOPYRROLATE 0.2 MG/ML
INJECTION INTRAMUSCULAR; INTRAVENOUS
Status: DISCONTINUED | OUTPATIENT
Start: 2025-08-25 | End: 2025-08-25 | Stop reason: SDUPTHER

## 2025-08-25 RX ORDER — BUPIVACAINE HYDROCHLORIDE AND EPINEPHRINE 2.5; 5 MG/ML; UG/ML
INJECTION, SOLUTION EPIDURAL; INFILTRATION; INTRACAUDAL; PERINEURAL PRN
Status: DISCONTINUED | OUTPATIENT
Start: 2025-08-25 | End: 2025-08-25 | Stop reason: ALTCHOICE

## 2025-08-25 RX ORDER — SODIUM CHLORIDE 0.9 % (FLUSH) 0.9 %
5-40 SYRINGE (ML) INJECTION PRN
Status: DISCONTINUED | OUTPATIENT
Start: 2025-08-25 | End: 2025-08-25 | Stop reason: HOSPADM

## 2025-08-25 RX ORDER — FENTANYL CITRATE 0.05 MG/ML
25 INJECTION, SOLUTION INTRAMUSCULAR; INTRAVENOUS EVERY 5 MIN PRN
Status: DISCONTINUED | OUTPATIENT
Start: 2025-08-25 | End: 2025-08-25 | Stop reason: HOSPADM

## 2025-08-25 RX ORDER — OXYCODONE AND ACETAMINOPHEN 5; 325 MG/1; MG/1
1 TABLET ORAL EVERY 6 HOURS PRN
Qty: 15 TABLET | Refills: 0 | Status: SHIPPED | OUTPATIENT
Start: 2025-08-25 | End: 2025-09-01

## 2025-08-25 RX ORDER — SODIUM CHLORIDE, SODIUM LACTATE, POTASSIUM CHLORIDE, CALCIUM CHLORIDE 600; 310; 30; 20 MG/100ML; MG/100ML; MG/100ML; MG/100ML
INJECTION, SOLUTION INTRAVENOUS CONTINUOUS
Status: DISCONTINUED | OUTPATIENT
Start: 2025-08-25 | End: 2025-08-25 | Stop reason: HOSPADM

## 2025-08-25 RX ORDER — SCOPOLAMINE 1 MG/3D
1 PATCH, EXTENDED RELEASE TRANSDERMAL ONCE
Status: DISCONTINUED | OUTPATIENT
Start: 2025-08-25 | End: 2025-08-25 | Stop reason: HOSPADM

## 2025-08-25 RX ORDER — OXYCODONE HYDROCHLORIDE 5 MG/1
10 TABLET ORAL PRN
Status: COMPLETED | OUTPATIENT
Start: 2025-08-25 | End: 2025-08-25

## 2025-08-25 RX ADMIN — NEOSTIGMINE METHYLSULFATE 3 MG: 1 INJECTION INTRAVENOUS at 12:03

## 2025-08-25 RX ADMIN — DEXAMETHASONE SODIUM PHOSPHATE 10 MG: 10 INJECTION INTRAMUSCULAR; INTRAVENOUS at 10:52

## 2025-08-25 RX ADMIN — FENTANYL CITRATE 50 MCG: 50 INJECTION, SOLUTION INTRAMUSCULAR; INTRAVENOUS at 10:40

## 2025-08-25 RX ADMIN — OXYCODONE HYDROCHLORIDE 5 MG: 5 TABLET ORAL at 13:09

## 2025-08-25 RX ADMIN — MIDAZOLAM 2 MG: 1 INJECTION INTRAMUSCULAR; INTRAVENOUS at 10:33

## 2025-08-25 RX ADMIN — DEXMEDETOMIDINE HYDROCHLORIDE 0.5 MCG/KG/HR: 100 INJECTION, SOLUTION INTRAVENOUS at 10:50

## 2025-08-25 RX ADMIN — GLYCOPYRROLATE 0.6 MG: 0.2 INJECTION INTRAMUSCULAR; INTRAVENOUS at 12:03

## 2025-08-25 RX ADMIN — FENTANYL CITRATE 50 MCG: 50 INJECTION, SOLUTION INTRAMUSCULAR; INTRAVENOUS at 10:44

## 2025-08-25 RX ADMIN — WATER 2000 MG: 1 INJECTION INTRAMUSCULAR; INTRAVENOUS; SUBCUTANEOUS at 10:30

## 2025-08-25 RX ADMIN — SODIUM CHLORIDE, POTASSIUM CHLORIDE, SODIUM LACTATE AND CALCIUM CHLORIDE: 600; 310; 30; 20 INJECTION, SOLUTION INTRAVENOUS at 09:30

## 2025-08-25 RX ADMIN — PROPOFOL 150 MG: 10 INJECTION, EMULSION INTRAVENOUS at 10:44

## 2025-08-25 RX ADMIN — FENTANYL CITRATE 50 MCG: 50 INJECTION, SOLUTION INTRAMUSCULAR; INTRAVENOUS at 10:55

## 2025-08-25 RX ADMIN — LIDOCAINE HYDROCHLORIDE 50 MG: 20 INJECTION, SOLUTION EPIDURAL; INFILTRATION; INTRACAUDAL; PERINEURAL at 10:44

## 2025-08-25 RX ADMIN — Medication 30 MG: at 10:45

## 2025-08-25 RX ADMIN — DIPHENHYDRAMINE HYDROCHLORIDE 12.5 MG: 50 INJECTION, SOLUTION INTRAMUSCULAR; INTRAVENOUS at 10:55

## 2025-08-25 RX ADMIN — GLYCOPYRROLATE 0.2 MG: 0.2 INJECTION INTRAMUSCULAR; INTRAVENOUS at 10:43

## 2025-08-25 RX ADMIN — ROCURONIUM BROMIDE 30 MG: 10 INJECTION, SOLUTION INTRAVENOUS at 10:44

## 2025-08-25 ASSESSMENT — PAIN SCALES - GENERAL: PAINLEVEL_OUTOF10: 5

## 2025-08-25 ASSESSMENT — PAIN DESCRIPTION - ORIENTATION: ORIENTATION: RIGHT;LEFT

## 2025-08-25 ASSESSMENT — PAIN - FUNCTIONAL ASSESSMENT
PAIN_FUNCTIONAL_ASSESSMENT: 0-10
PAIN_FUNCTIONAL_ASSESSMENT: PREVENTS OR INTERFERES SOME ACTIVE ACTIVITIES AND ADLS
PAIN_FUNCTIONAL_ASSESSMENT: 0-10
PAIN_FUNCTIONAL_ASSESSMENT: PREVENTS OR INTERFERES SOME ACTIVE ACTIVITIES AND ADLS
PAIN_FUNCTIONAL_ASSESSMENT: 0-10
PAIN_FUNCTIONAL_ASSESSMENT: 0-10

## 2025-08-25 ASSESSMENT — PAIN DESCRIPTION - DESCRIPTORS
DESCRIPTORS: ACHING;DISCOMFORT

## 2025-08-25 ASSESSMENT — LIFESTYLE VARIABLES: SMOKING_STATUS: 0

## 2025-08-25 ASSESSMENT — PAIN DESCRIPTION - LOCATION: LOCATION: BREAST

## 2025-08-28 ENCOUNTER — OFFICE VISIT (OUTPATIENT)
Dept: SURGERY | Age: 60
End: 2025-08-28

## 2025-08-28 VITALS — TEMPERATURE: 97.8 F | OXYGEN SATURATION: 99 % | HEART RATE: 94 BPM

## 2025-08-28 DIAGNOSIS — N62 MACROMASTIA: Primary | ICD-10-CM

## 2025-08-28 LAB — SURGICAL PATHOLOGY REPORT: NORMAL

## 2025-08-28 PROCEDURE — 99024 POSTOP FOLLOW-UP VISIT: CPT | Performed by: PHYSICIAN ASSISTANT

## 2025-09-03 DIAGNOSIS — G72.0 STATIN MYOPATHY: ICD-10-CM

## 2025-09-03 DIAGNOSIS — E78.01 FAMILIAL HYPERCHOLESTEROLEMIA: ICD-10-CM

## 2025-09-03 DIAGNOSIS — T46.6X5A STATIN MYOPATHY: ICD-10-CM

## 2025-09-03 RX ORDER — EVOLOCUMAB 140 MG/ML
140 INJECTION, SOLUTION SUBCUTANEOUS
Qty: 6 ML | Refills: 0 | Status: SHIPPED | OUTPATIENT
Start: 2025-09-03

## (undated) DEVICE — SYRINGE, LUER LOCK, 10ML: Brand: MEDLINE

## (undated) DEVICE — ADHESIVE SKIN CLSR 0.7ML SGL PEEL PCH GEL WTRPRF FOR WOUNDS

## (undated) DEVICE — STANDARD HYPODERMIC NEEDLE,ALUMINUM HUB: Brand: MONOJECT

## (undated) DEVICE — 5.0MM COARSE DIAMOND ROUND

## (undated) DEVICE — GARMENT COMPR STD FOR 17IN CALF UNIF THER FLOTRN

## (undated) DEVICE — PROBE 8225101 5PK STD PRASS FL TIP ROHS

## (undated) DEVICE — 1 ML TUBERCULIN SYRINGE,DETACHABLE NEEDLE: Brand: MONOJECT

## (undated) DEVICE — MASTISOL ADHESIVE LIQ 2/3ML

## (undated) DEVICE — POUCH FLD COLL W/ DRNGE PRT N LINTING TEAR RESIST MOLD STRP

## (undated) DEVICE — BLADE, TONGUE, 6", STERILE: Brand: MEDLINE

## (undated) DEVICE — SOLUTION IV 1000ML 0.9% SOD CHL PH 5 INJ USP VIAFLX PLAS

## (undated) DEVICE — Device

## (undated) DEVICE — SUTURE PDS II SZ 2-0 L27IN ABSRB VLT SH L26MM 1/2 CIR Z317H

## (undated) DEVICE — 4-PORT MANIFOLD: Brand: NEPTUNE 2

## (undated) DEVICE — SKN PREP SPNG STKS PVP PNT STR: Brand: MEDLINE INDUSTRIES, INC.

## (undated) DEVICE — BLADE SURG NO10 S STL REUSE HNDL CONVENTIONAL DISP

## (undated) DEVICE — BLADE,STAINLESS-STEEL,15,STRL,DISPOSABLE: Brand: MEDLINE

## (undated) DEVICE — COUNTER NDL 10 COUNT HLD 20 FOAM BLK SGL MAG

## (undated) DEVICE — GLOVE SURG SZ 55 CRM LTX FREE POLYISOPRENE POLYMER BEAD ANTI

## (undated) DEVICE — TOWEL SURG W17XL27IN BLU COT STD PREWASHED STERILE 6 PER PK

## (undated) DEVICE — TUBING PMP LG BOR 13 FT FOR INFLTR SYS SINGLE SPIKE SFTTCH

## (undated) DEVICE — TOWEL,OR,DSP,ST,BLUE,STD,6/PK,12PK/CS: Brand: MEDLINE

## (undated) DEVICE — ELECTRODE 8227410 PAIRED 2 CH SET ROHS

## (undated) DEVICE — MARKER,SKIN,WI/RULER AND LABELS: Brand: MEDLINE

## (undated) DEVICE — DRAPE SURG ST W40XL58IN STD POLYPR HALF DISP

## (undated) DEVICE — ELECTRODE PT RET AD L9FT HI MOIST COND ADH HYDRGEL CORDED

## (undated) DEVICE — GLOVE SURG SZ 7.5 L11.6IN FNGR THK6.9MIL CUF THK5.5MIL LTX

## (undated) DEVICE — GAUZE,SPONGE,4"X4",8PLY,STRL,LF,10/TRAY: Brand: MEDLINE

## (undated) DEVICE — SYRINGE MEDICAL 3ML CLEAR PLASTIC STANDARD NON CONTROL LUERLOCK TIP DISPOSABLE

## (undated) DEVICE — DOUBLE BASIN SET: Brand: MEDLINE INDUSTRIES, INC.

## (undated) DEVICE — PACK PROCEDURE SURG GEN CUST

## (undated) DEVICE — SUTURE MONOCRYL + ABSORBABLE MONOFILAMENT 3-0 PS-2 27 IN UD SXMP1B109

## (undated) DEVICE — SURGICAL PROCEDURE PACK EENT CUST

## (undated) DEVICE — 1000 S-DRAPE TOWEL DRAPE 10/BX: Brand: STERI-DRAPE™

## (undated) DEVICE — SYRINGE MED 12ML STD CLR PLAS LUERLOCK TIP ULT SHRP

## (undated) DEVICE — SPONGE LAP X RAY DETECTABLE 12X12 IN 4 PLY COTTON WHT  STERIL

## (undated) DEVICE — SOLUTION IRRIG 1000ML 09% SOD CHL USP PIC PLAS CONTAINER

## (undated) DEVICE — NEEDLE HYPO 25GA L1.5IN BLU POLYPR HUB S STL REG BVL STR

## (undated) DEVICE — NEEDLE HYPO 18GA L1.5IN PNK POLYPR HUB S STL REG BVL STR

## (undated) DEVICE — COVER LT HNDL GRN PLAS SFT FLX 2 PER PK

## (undated) DEVICE — 1.5MM FINE DIAMOND ROUND EXTENDED

## (undated) DEVICE — STAPLER SKIN SQ 30 ABSRB STPL DISP INSORB ORDER VIA PHONE OR EMAIL

## (undated) DEVICE — SYRINGE MED 50ML LUERLOCK TIP

## (undated) DEVICE — COVER HNDL LT DISP

## (undated) DEVICE — NEEDLE FLTR 18GA L1.5IN MEM THK5UM BLNT DISP

## (undated) DEVICE — STRIP SKIN CLSR W1XL5IN WHT NONWOVEN BK ADH REINF HYPOALRG

## (undated) DEVICE — DRAPE MICROSCOPE PRESCOTT

## (undated) DEVICE — TUBING SUCT L10FT L0.1875IN CONN STR UNIV W/ RIB FEM CONN

## (undated) DEVICE — 18 GA N.G. KIT, 10 PACK: Brand: SITE-RITE

## (undated) DEVICE — AGENT HEMSTAT 5GM ARISTA AH

## (undated) DEVICE — DISPOSABLE IRRIGATION CASSETTE: Brand: CORE

## (undated) DEVICE — KIT,ANTI FOG,W/SPONGE & FLUID,SOFT PACK: Brand: MEDLINE

## (undated) DEVICE — 1.0MM FINE DIAMOND ROUND EXTENDED

## (undated) DEVICE — SPECIMEN CUP W/LID: Brand: DEROYAL

## (undated) DEVICE — DRESSING EAR AD 5.5IN GLSCOCK

## (undated) DEVICE — BLADE OPHTH GRN ROUNDED TIP 1 SIDE SHRP GRINDLESS MINI-BLDE

## (undated) DEVICE — SYRINGE IRRIG 60ML SFT PLIABLE BLB EZ TO GRP 1 HND USE W/

## (undated) DEVICE — KIT SURG W7XL11IN 2 PKT UNTREATED NA

## (undated) DEVICE — SOLUTION IRRIG 1000ML 0.9% SOD CHL USP POUR PLAS BTL

## (undated) DEVICE — APPLICATOR MEDICATED 26 CC SOLUTION HI LT ORNG CHLORAPREP

## (undated) DEVICE — 3.0MM FINE DIAMOND ROUND EXTENDED

## (undated) DEVICE — GOWN SURG L L43IN BLU SMS NONREINFORCED W/ SET IN SLV AAMI

## (undated) DEVICE — DRESSING EAR PED 3.5IN PLAS SHELL 2 MOLD PDDED ADJ VELC

## (undated) DEVICE — PEN SURG MARKING 9.5X12.75X5.5 IN STD PT REG TIP 9 LBL DEVON

## (undated) DEVICE — 2.0MM FINE DIAMOND ROUND EXTENDED

## (undated) DEVICE — TIP SUCTION TRANSPAR RIB SURF STD BLB RIG  YANKAUER

## (undated) DEVICE — SUTURE MONOCRYL SZ 3-0 L27IN ABSRB UD L26MM SH 1/2 CIR Y416H

## (undated) DEVICE — SPONGE GZ KERLIX W4.5INXL4.1YD COT 6 PLY OPN WV STRETCHABLE

## (undated) DEVICE — CATHETER IV 18 GAX1.25 IN WNG INTROCAN SAFETY

## (undated) DEVICE — 7CM ELITE IRRIGATION SLEEVE

## (undated) DEVICE — CORD,CAUTERY,BIPOLAR,STERILE: Brand: MEDLINE